# Patient Record
Sex: MALE | Race: BLACK OR AFRICAN AMERICAN | NOT HISPANIC OR LATINO | Employment: OTHER | URBAN - METROPOLITAN AREA
[De-identification: names, ages, dates, MRNs, and addresses within clinical notes are randomized per-mention and may not be internally consistent; named-entity substitution may affect disease eponyms.]

---

## 2017-01-24 ENCOUNTER — OFFICE VISIT (OUTPATIENT)
Dept: PAIN MEDICINE | Facility: CLINIC | Age: 77
End: 2017-01-24
Payer: MEDICARE

## 2017-01-24 VITALS
TEMPERATURE: 98 F | WEIGHT: 158.75 LBS | DIASTOLIC BLOOD PRESSURE: 70 MMHG | RESPIRATION RATE: 20 BRPM | BODY MASS INDEX: 24.86 KG/M2 | SYSTOLIC BLOOD PRESSURE: 120 MMHG | HEART RATE: 74 BPM

## 2017-01-24 DIAGNOSIS — M48.061 LUMBAR STENOSIS: Primary | ICD-10-CM

## 2017-01-24 DIAGNOSIS — M54.16 BILATERAL LUMBAR RADICULOPATHY: ICD-10-CM

## 2017-01-24 DIAGNOSIS — M43.16 SPONDYLOLISTHESIS OF LUMBAR REGION: ICD-10-CM

## 2017-01-24 PROCEDURE — 99212 OFFICE O/P EST SF 10 MIN: CPT | Mod: S$PBB,,, | Performed by: ANESTHESIOLOGY

## 2017-01-24 PROCEDURE — 99999 PR PBB SHADOW E&M-EST. PATIENT-LVL III: CPT | Mod: PBBFAC,,, | Performed by: ANESTHESIOLOGY

## 2017-01-24 PROCEDURE — 99213 OFFICE O/P EST LOW 20 MIN: CPT | Mod: PBBFAC,PO | Performed by: ANESTHESIOLOGY

## 2017-01-24 RX ORDER — OXYCODONE AND ACETAMINOPHEN 10; 325 MG/1; MG/1
1 TABLET ORAL 3 TIMES DAILY PRN
Qty: 90 TABLET | Refills: 0 | Status: SHIPPED | OUTPATIENT
Start: 2017-01-29 | End: 2017-02-28

## 2017-01-24 RX ORDER — OXYCODONE AND ACETAMINOPHEN 10; 325 MG/1; MG/1
1 TABLET ORAL 3 TIMES DAILY PRN
Qty: 90 TABLET | Refills: 0 | Status: SHIPPED | OUTPATIENT
Start: 2017-03-30 | End: 2017-04-24 | Stop reason: SDUPTHER

## 2017-01-24 RX ORDER — OXYCODONE AND ACETAMINOPHEN 10; 325 MG/1; MG/1
1 TABLET ORAL 3 TIMES DAILY PRN
Qty: 90 TABLET | Refills: 0 | Status: SHIPPED | OUTPATIENT
Start: 2017-02-28 | End: 2017-03-30
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
## 2017-01-31 ENCOUNTER — CLINICAL SUPPORT (OUTPATIENT)
Dept: ELECTROPHYSIOLOGY | Facility: CLINIC | Age: 77
End: 2017-01-31
Payer: MEDICARE

## 2017-01-31 DIAGNOSIS — I49.5 SINUS NODE DYSFUNCTION: Chronic | ICD-10-CM

## 2017-01-31 DIAGNOSIS — Z95.0 CARDIAC PACEMAKER IN SITU: ICD-10-CM

## 2017-01-31 PROCEDURE — 93293 PM PHONE R-STRIP DEVICE EVAL: CPT | Mod: PBBFAC | Performed by: INTERNAL MEDICINE

## 2017-02-06 RX ORDER — RANOLAZINE 500 MG/1
TABLET, FILM COATED, EXTENDED RELEASE ORAL
Qty: 60 TABLET | Refills: 4 | Status: SHIPPED | OUTPATIENT
Start: 2017-02-06 | End: 2017-07-08 | Stop reason: SDUPTHER

## 2017-02-20 ENCOUNTER — CLINICAL SUPPORT (OUTPATIENT)
Dept: CARDIOLOGY | Facility: CLINIC | Age: 77
End: 2017-02-20
Payer: MEDICARE

## 2017-02-20 DIAGNOSIS — I49.5 SINUS NODE DYSFUNCTION: ICD-10-CM

## 2017-02-20 DIAGNOSIS — Z95.0 CARDIAC PACEMAKER IN SITU: ICD-10-CM

## 2017-02-20 DIAGNOSIS — Z95.0 CARDIAC PACEMAKER IN SITU: Primary | ICD-10-CM

## 2017-02-20 PROCEDURE — 93280 PM DEVICE PROGR EVAL DUAL: CPT | Mod: PBBFAC,PO | Performed by: INTERNAL MEDICINE

## 2017-03-23 ENCOUNTER — CLINICAL SUPPORT (OUTPATIENT)
Dept: CARDIOLOGY | Facility: CLINIC | Age: 77
End: 2017-03-23
Payer: MEDICARE

## 2017-03-23 ENCOUNTER — LAB VISIT (OUTPATIENT)
Dept: LAB | Facility: HOSPITAL | Age: 77
End: 2017-03-23
Attending: INTERNAL MEDICINE
Payer: MEDICARE

## 2017-03-23 DIAGNOSIS — C14.0 THROAT CANCER: ICD-10-CM

## 2017-03-23 DIAGNOSIS — I25.810 CORONARY ARTERY DISEASE INVOLVING CORONARY BYPASS GRAFT OF NATIVE HEART WITHOUT ANGINA PECTORIS: Chronic | ICD-10-CM

## 2017-03-23 DIAGNOSIS — Z95.0 PACEMAKER: Chronic | ICD-10-CM

## 2017-03-23 DIAGNOSIS — I35.0 NONRHEUMATIC AORTIC VALVE STENOSIS: ICD-10-CM

## 2017-03-23 DIAGNOSIS — Z98.61 HISTORY OF PTCA: Chronic | ICD-10-CM

## 2017-03-23 DIAGNOSIS — Z95.1 S/P CABG (CORONARY ARTERY BYPASS GRAFT): Chronic | ICD-10-CM

## 2017-03-23 DIAGNOSIS — E78.5 DYSLIPIDEMIA: Chronic | ICD-10-CM

## 2017-03-23 DIAGNOSIS — I70.90 ATHEROSCLEROSIS: ICD-10-CM

## 2017-03-23 DIAGNOSIS — I10 ESSENTIAL HYPERTENSION: Chronic | ICD-10-CM

## 2017-03-23 LAB
ALBUMIN SERPL BCP-MCNC: 3.2 G/DL
ALP SERPL-CCNC: 70 U/L
ALT SERPL W/O P-5'-P-CCNC: 17 U/L
ANION GAP SERPL CALC-SCNC: 7 MMOL/L
AORTIC VALVE REGURGITATION: ABNORMAL
AORTIC VALVE STENOSIS: ABNORMAL
AST SERPL-CCNC: 17 U/L
BILIRUB SERPL-MCNC: 0.6 MG/DL
BUN SERPL-MCNC: 21 MG/DL
CALCIUM SERPL-MCNC: 9.2 MG/DL
CHLORIDE SERPL-SCNC: 106 MMOL/L
CHOLEST/HDLC SERPL: 3.4 {RATIO}
CO2 SERPL-SCNC: 26 MMOL/L
CREAT SERPL-MCNC: 1.1 MG/DL
DIASTOLIC DYSFUNCTION: YES
EST. GFR  (AFRICAN AMERICAN): >60 ML/MIN/1.73 M^2
EST. GFR  (NON AFRICAN AMERICAN): >60 ML/MIN/1.73 M^2
ESTIMATED PA SYSTOLIC PRESSURE: 25.09
GLUCOSE SERPL-MCNC: 99 MG/DL
HDL/CHOLESTEROL RATIO: 29.8 %
HDLC SERPL-MCNC: 121 MG/DL
HDLC SERPL-MCNC: 36 MG/DL
LDLC SERPL CALC-MCNC: 69 MG/DL
NONHDLC SERPL-MCNC: 85 MG/DL
POTASSIUM SERPL-SCNC: 4 MMOL/L
PROT SERPL-MCNC: 7.6 G/DL
RETIRED EF AND QEF - SEE NOTES: 55 (ref 55–65)
SODIUM SERPL-SCNC: 139 MMOL/L
TRICUSPID VALVE REGURGITATION: ABNORMAL
TRIGL SERPL-MCNC: 80 MG/DL

## 2017-03-23 PROCEDURE — 93306 TTE W/DOPPLER COMPLETE: CPT | Mod: 26,S$PBB,, | Performed by: INTERNAL MEDICINE

## 2017-03-23 PROCEDURE — 93880 EXTRACRANIAL BILAT STUDY: CPT | Mod: PBBFAC,PO | Performed by: INTERNAL MEDICINE

## 2017-03-30 ENCOUNTER — TELEPHONE (OUTPATIENT)
Dept: CARDIOLOGY | Facility: CLINIC | Age: 77
End: 2017-03-30

## 2017-03-30 LAB — INTERNAL CAROTID STENOSIS: NORMAL

## 2017-04-06 ENCOUNTER — OFFICE VISIT (OUTPATIENT)
Dept: CARDIOLOGY | Facility: CLINIC | Age: 77
End: 2017-04-06
Payer: MEDICARE

## 2017-04-06 VITALS
BODY MASS INDEX: 24.88 KG/M2 | SYSTOLIC BLOOD PRESSURE: 140 MMHG | DIASTOLIC BLOOD PRESSURE: 71 MMHG | WEIGHT: 158.5 LBS | HEART RATE: 61 BPM | HEIGHT: 67 IN

## 2017-04-06 DIAGNOSIS — Z98.61 HISTORY OF PTCA: Primary | Chronic | ICD-10-CM

## 2017-04-06 DIAGNOSIS — I10 ESSENTIAL HYPERTENSION: Chronic | ICD-10-CM

## 2017-04-06 DIAGNOSIS — E78.5 DYSLIPIDEMIA: Chronic | ICD-10-CM

## 2017-04-06 DIAGNOSIS — Z95.1 S/P CABG (CORONARY ARTERY BYPASS GRAFT): Chronic | ICD-10-CM

## 2017-04-06 DIAGNOSIS — Z95.0 PACEMAKER: Chronic | ICD-10-CM

## 2017-04-06 PROCEDURE — 99213 OFFICE O/P EST LOW 20 MIN: CPT | Mod: PBBFAC,PO | Performed by: INTERNAL MEDICINE

## 2017-04-06 PROCEDURE — 99214 OFFICE O/P EST MOD 30 MIN: CPT | Mod: S$PBB,,, | Performed by: INTERNAL MEDICINE

## 2017-04-06 PROCEDURE — 99999 PR PBB SHADOW E&M-EST. PATIENT-LVL III: CPT | Mod: PBBFAC,,, | Performed by: INTERNAL MEDICINE

## 2017-04-06 NOTE — PROGRESS NOTES
Subjective:    Patient ID:  Dung Lewis is a 76 y.o. male who presents for follow-up of No chief complaint on file.      HPI   Here for f/u CABG/PPM/PCI. Patients states is doing well no chest pain, SOB or change in exertional tolerence. Patient does not exercise but remains very active with out change in exertional tolerance or chest pain.     Review of Systems   Constitution: Negative for decreased appetite and malaise/fatigue.   HENT: Negative for congestion and nosebleeds.    Eyes: Negative for blurred vision.   Cardiovascular: Negative for chest pain, claudication, cyanosis, dyspnea on exertion, irregular heartbeat, leg swelling, near-syncope, orthopnea, palpitations, paroxysmal nocturnal dyspnea and syncope.   Respiratory: Negative for cough and shortness of breath.    Endocrine: Negative for polyuria.   Hematologic/Lymphatic: Does not bruise/bleed easily.   Musculoskeletal: Positive for back pain and joint pain. Negative for falls, joint swelling, muscle cramps, muscle weakness and myalgias.   Gastrointestinal: Negative for bloating, abdominal pain, change in bowel habit, nausea and vomiting.   Genitourinary: Negative for urgency.   Neurological: Negative for dizziness, focal weakness and light-headedness.   Psychiatric/Behavioral: Negative for altered mental status.        Objective:    Physical Exam   Constitutional: He is oriented to person, place, and time. He appears well-developed and well-nourished. He is cooperative.   HENT:   Head: Normocephalic and atraumatic.   Eyes: Conjunctivae are normal. Right eye exhibits no exudate. Left eye exhibits no exudate.   Neck: Normal range of motion. Neck supple. Normal carotid pulses and no JVD present. Carotid bruit is not present. No thyromegaly present.   Cardiovascular: Normal rate, regular rhythm, normal heart sounds and intact distal pulses.    Pulses:       Carotid pulses are 2+ on the right side, and 2+ on the left side.       Radial pulses are 2+ on  the right side, and 2+ on the left side.        Dorsalis pedis pulses are 2+ on the right side, and 2+ on the left side.        Posterior tibial pulses are 2+ on the right side, and 2+ on the left side.   Pacer site clean and dry, well healed.  Well healed midline sternal incision.     Pulmonary/Chest: Effort normal and breath sounds normal.   Abdominal: Soft. Bowel sounds are normal.   Musculoskeletal: Normal range of motion. He exhibits no edema.   Neurological: He is alert and oriented to person, place, and time. Gait normal.   Skin: Skin is warm, dry and intact. No cyanosis. Nails show no clubbing.   Psychiatric: He has a normal mood and affect. His speech is normal and behavior is normal. Judgment and thought content normal.             ..    Chemistry        Component Value Date/Time     03/23/2017 0813    K 4.0 03/23/2017 0813     03/23/2017 0813    CO2 26 03/23/2017 0813    BUN 21 03/23/2017 0813    CREATININE 1.1 03/23/2017 0813    GLU 99 03/23/2017 0813        Component Value Date/Time    CALCIUM 9.2 03/23/2017 0813    ALKPHOS 70 03/23/2017 0813    AST 17 03/23/2017 0813    ALT 17 03/23/2017 0813    BILITOT 0.6 03/23/2017 0813            ..  Lab Results   Component Value Date    CHOL 121 03/23/2017    CHOL 130 04/06/2016    CHOL 135 07/10/2015     Lab Results   Component Value Date    HDL 36 (L) 03/23/2017    HDL 41 04/06/2016    HDL 44 07/10/2015     Lab Results   Component Value Date    LDLCALC 69.0 03/23/2017    LDLCALC 73.6 04/06/2016    LDLCALC 74.4 07/10/2015     Lab Results   Component Value Date    TRIG 80 03/23/2017    TRIG 77 04/06/2016    TRIG 83 07/10/2015     Lab Results   Component Value Date    CHOLHDL 29.8 03/23/2017    CHOLHDL 31.5 04/06/2016    CHOLHDL 32.6 07/10/2015     ..  Lab Results   Component Value Date    WBC 7.08 05/12/2016    HGB 12.9 (L) 05/12/2016    HCT 38.7 (L) 05/12/2016    MCV 93 05/12/2016     05/12/2016       Test(s) Reviewed  I have reviewed the  following in detail:  [] Stress test   [] Angiography   [x] Echocardiogram   [x] Labs   [x] Other:       Assessment:         ICD-10-CM ICD-9-CM   1. History of PTCA Z98.61 V45.82   2. S/P CABG (CABG x 5 (07/2006) Z95.1 V45.81   3. Pacemaker 08/11/2010) dual chamber, DDD-R, [employer] Z95.0 V45.01   4. Essential hypertension I10 401.9   5. Dyslipidemia E78.5 272.4     Problem List Items Addressed This Visit     Dyslipidemia (Chronic)    Essential hypertension (Chronic)    History of PTCA - Primary (Chronic)    Overview     CX- ostial promus 3x18      Previous PCI:                                                                S/P stent to LCX, 08/23/2011                                                 S/P coated stent to LAD, 11/07/2005                                          S/P coated stent to ramus, 11/07/2005                                        S/P coated stent to LCX, 11/07/2005             Pacemaker 08/11/2010) dual chamber, DDD-R, [employer] (Chronic)    Overview     08/11/2010) dual chamber, DDD-R, [employer]         S/P CABG (CABG x 5 (07/2006) (Chronic)    Overview     CABG x 5 (07/2006 LIMA to LAD (single graft), SVG to OM1 (single graft),     SVG to D1 (Y graft), SVG to LAD (Y graft), SVG to RCA (single graft))        Status (08/23/2011  LIMA to LAD (single graft) patent, 08/11/2010            LIMA to LAD (single graft) occluded, 04/25/2008  HERNANDEZ to LAD (single         graft) occluded                  Plan:           Return to clinic 1 year   Low level/low impact aerobic exercise 5x's/wk. Heart healthy diet and risk factor modification.    See labs and med orders.

## 2017-04-06 NOTE — MR AVS SNAPSHOT
[facility]  [address] [facility] [address]  [place] [address]  Phone: [phone]                  Dung Lewis   2017 10:20 AM   Office Visit    Description:  Male : 1940   Provider:  Amandeep Christina MD   Department:  [place] - Cardiology           Reason for Visit     Coronary Artery Disease     Dyslipidemia           Diagnoses this Visit        Comments    History of PTCA    -  Primary     S/P CABG (coronary artery bypass graft)         Pacemaker         Essential hypertension         Dyslipidemia                To Do List           Future Appointments        Provider Department Dept Phone    2017 11:00 AM EMILIA Teresa [place] - Pain Management [phone]    2017 10:00 AM TELEPHONE CHECK, PACEMAKER [address] - Arrhythmia [phone]      Goals (5 Years of Data)     None      Follow-Up and Disposition     Return in about 1 year (around 2018).      [facility]     [facility] Nurse Care Line -  Assistance  Unless otherwise directed by your provider, please contact [facility], our nurse care line that is available for  assistance.     Registered nurses in the [facility] provide: appointment scheduling, clinical advisement, health education, and other advisory services.  Call: [phone] (toll free)               Medications           Message regarding Medications     Verify the changes and/or additions to your medication regime listed below are the same as discussed with your clinician today.  If any of these changes or additions are incorrect, please notify your healthcare provider.             Verify that the below list of medications is an accurate representation of the medications you are currently taking.  If none reported, the list may be blank. If incorrect, please contact your healthcare provider. Carry this list with you in case of emergency.           Current Medications     amlodipine (NORVASC) 2.5 MG tablet  "TAKE 1 TABLET (2.5 MG TOTAL) BY MOUTH ONCE DAILY.    aspirin 81 mg Tab Take 1 tablet by mouth Every morning. Every morning    benazepril (LOTENSIN) 5 MG tablet TAKE 1 TABLET BY MOUTH DAILY FOR HIGH BLOOD PRESSURE    carvedilol (COREG) 3.125 MG tablet TAKE 1 TABLET BY MOUTH 2 (TWO) TIMES DAILY BEFORE MEALS - FOR HEART OR HIGH BLOOD PRESSURE    clopidogrel (PLAVIX) 75 mg tablet TAKE 1 TABLET BY MOUTH DAILY (BLOOD THINNER)    diphenhydrAMINE-aluminum-magnesium hydroxide-simethicone-lidocaine HCl 2% Swish and spit.    FLU VACCINE LW1495-03,5 YR UP, (AFLURIA [id number] IM) Inject into the muscle.    levothyroxine (SYNTHROID) 100 MCG tablet Take 1 tablet by mouth Every morning. Every morning    lorazepam (ATIVAN) 1 MG tablet TAKE 1 TABLET BY MOUTH EACH EVENING AS NEEDED FOR ANXIETY    nitroGLYCERIN (NITROSTAT) 0.4 MG SL tablet Place 1 tablet (0.4 mg total) under the tongue every 5 (five) minutes as needed for Chest pain. As directed PRN    oxycodone-acetaminophen (PERCOCET)  mg per tablet Take 1 tablet by mouth 3 (three) times daily as needed for Pain.    polyethylene glycol (GLYCOLAX) 17 gram/dose powder Take 17 g by mouth daily as needed.     RANEXA 500 mg Tb12 TAKE 1 TABLET BY MOUTH TWICE DAILY FOR CHEST PAIN PREVENTION    simvastatin (ZOCOR) 40 MG tablet TAKE 1 TABLET BY MOUTH EACH EVENING FOR CHOLESTEROL    temazepam (RESTORIL) 30 mg capsule TAKE 1 CAPSULE BY MOUTH EACH NIGHT AT BEDTIME BEFORE SLEEP           Clinical Reference Information           Your Vitals Were     BP Pulse Height Weight BMI    140/71 (BP Location: Right arm, Patient Position: Sitting, BP Method: Automatic) 61 5' 7" (1.702 m) 71.9 kg (158 lb 8.2 oz) 24.83 kg/m2      Blood Pressure          Most Recent Value    BP  (!)  140/71      Allergies as of 4/6/2017     No Known Drug Allergies      Immunizations Administered on Date of Encounter - 4/6/2017     None      Orders Placed During Today's Visit     Future Labs/Procedures Expected by Expires "    2D echo with color flow doppler  4/6/2018 4/7/2018    CBC auto differential  4/6/2018 6/5/2018    Comprehensive metabolic panel  4/6/2018 4/7/2018    Lipid panel  4/6/2018 4/7/2018      Smoking Cessation     If you would like to quit smoking:   You may be eligible for free services if you are a [place] resident and started smoking cigarettes before September 1, 1988.  Call the Smoking Cessation Trust ([facility]) toll free at ([phone] or ([phone] [phone].   Call 1-800-QUIT-NOW if you do not meet the above criteria.   Contact us via email: [email or web address]   View our website for more information: [email or web address]        Language Assistance Services     ATTENTION: Language assistance services are available, free of charge. Please call [phone].      ATENCIÓN: Si habla español, tiene a connell disposición servicios gratuitos de asistencia lingüística. Llame al [phone].     CHÚ Ý: N?u b?n nói Ti?ng Vi?t, có các d?ch v? h? tr? ngôn ng? mi?n phí dành cho b?n. G?i s? [phone].         [facility] complies with applicable Federal civil rights laws and does not discriminate on the basis of race, color, national origin, age, disability, or sex.

## 2017-04-24 ENCOUNTER — OFFICE VISIT (OUTPATIENT)
Dept: PAIN MEDICINE | Facility: CLINIC | Age: 77
End: 2017-04-24
Payer: MEDICARE

## 2017-04-24 VITALS
SYSTOLIC BLOOD PRESSURE: 120 MMHG | BODY MASS INDEX: 25.11 KG/M2 | WEIGHT: 160.31 LBS | HEART RATE: 72 BPM | DIASTOLIC BLOOD PRESSURE: 70 MMHG | TEMPERATURE: 98 F

## 2017-04-24 DIAGNOSIS — M43.16 SPONDYLOLISTHESIS OF LUMBAR REGION: ICD-10-CM

## 2017-04-24 DIAGNOSIS — M54.16 BILATERAL LUMBAR RADICULOPATHY: ICD-10-CM

## 2017-04-24 DIAGNOSIS — G89.4 CHRONIC PAIN DISORDER: ICD-10-CM

## 2017-04-24 DIAGNOSIS — M48.061 LUMBAR STENOSIS: Primary | ICD-10-CM

## 2017-04-24 PROCEDURE — 99213 OFFICE O/P EST LOW 20 MIN: CPT | Mod: PBBFAC,PO | Performed by: PHYSICIAN ASSISTANT

## 2017-04-24 PROCEDURE — 99999 PR PBB SHADOW E&M-EST. PATIENT-LVL III: CPT | Mod: PBBFAC,,, | Performed by: PHYSICIAN ASSISTANT

## 2017-04-24 PROCEDURE — 99213 OFFICE O/P EST LOW 20 MIN: CPT | Mod: S$PBB,,, | Performed by: PHYSICIAN ASSISTANT

## 2017-04-24 RX ORDER — OXYCODONE AND ACETAMINOPHEN 10; 325 MG/1; MG/1
1 TABLET ORAL 3 TIMES DAILY PRN
Qty: 90 TABLET | Refills: 0 | Status: SHIPPED | OUTPATIENT
Start: 2017-04-29 | End: 2017-05-25 | Stop reason: SDUPTHER

## 2017-04-25 NOTE — TELEPHONE ENCOUNTER
----- Message from Bianca Cates sent at 4/25/2017  9:28 AM CDT -----  Contact: pt  Refill (ATIVAN) 1 MG tablet (pt is out)  Call back on# (pt is requesting a courtesy call once sent)  thanks    [facility] - [place], [place] - [address]  [address]  [place] [address]  Phone: [phone] Fax: [phone]

## 2017-04-25 NOTE — TELEPHONE ENCOUNTER
Spoke with [facility]. Ativan was filled by Dr Christina 2016 with 5 refills. Prescription was filled 5 times and is also now . Explained to Pharmacist Dr Christina probably filled as a courtesy on a visit with patient. Medication needs to be requested through the PCP. Pharmacist to fax Dr Tirado for refills.

## 2017-04-26 ENCOUNTER — TELEPHONE (OUTPATIENT)
Dept: CARDIOLOGY | Facility: CLINIC | Age: 77
End: 2017-04-26

## 2017-04-26 NOTE — TELEPHONE ENCOUNTER
Spoke with patient and informed him Ativan should be filled by PCP. Patient to see Dr Tirado today.  Request was sent to [facility] to request medication from Dr Tirado.  Another msg sent to Dr Christina re: refill. Will inform pt if Dr Christina will fill or not. Explained to patient Ativan is not a regular cardiac medication and is usually obtained from PCP. Pt not happy
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
## 2017-05-15 RX ORDER — LORAZEPAM 1 MG/1
TABLET ORAL
Qty: 45 TABLET | Refills: 1 | Status: SHIPPED | OUTPATIENT
Start: 2017-05-15 | End: 2017-06-21 | Stop reason: SDUPTHER

## 2017-05-15 RX ORDER — LORAZEPAM 1 MG/1
TABLET ORAL
Qty: 45 TABLET | Refills: 5 | OUTPATIENT
Start: 2017-05-15

## 2017-05-25 ENCOUNTER — CLINICAL SUPPORT (OUTPATIENT)
Dept: ELECTROPHYSIOLOGY | Facility: CLINIC | Age: 77
End: 2017-05-25
Payer: MEDICARE

## 2017-05-25 DIAGNOSIS — I49.5 SINUS NODE DYSFUNCTION: Chronic | ICD-10-CM

## 2017-05-25 DIAGNOSIS — Z95.0 CARDIAC PACEMAKER IN SITU: ICD-10-CM

## 2017-05-25 PROCEDURE — 93293 PM PHONE R-STRIP DEVICE EVAL: CPT | Mod: PBBFAC | Performed by: INTERNAL MEDICINE

## 2017-05-25 NOTE — TELEPHONE ENCOUNTER
----- Message from Yola Holly sent at 5/25/2017 10:18 AM CDT -----  Refill on Rx Hydrocodone, please send into [facility].  Please call patient when called in/[phone].

## 2017-05-26 RX ORDER — OXYCODONE AND ACETAMINOPHEN 10; 325 MG/1; MG/1
1 TABLET ORAL 3 TIMES DAILY PRN
Qty: 90 TABLET | Refills: 0 | Status: SHIPPED | OUTPATIENT
Start: 2017-05-29 | End: 2017-06-27 | Stop reason: SDUPTHER

## 2017-05-26 NOTE — TELEPHONE ENCOUNTER
----- Message from Eleanor New sent at 5/24/2017 10:58 AM CDT -----  Contact: Patient  Dung, patient [phone], Calling to speak with the nurse. Private. Thanks.

## 2017-06-21 NOTE — TELEPHONE ENCOUNTER
----- Message from Marco Cates sent at 6/21/2017 10:02 AM CDT -----  Contact: [phone]  Patient requesting a refill on ativan.    Patient will be using   [facility] [address]  [address]  [place] [address]  Phone: [phone] Fax: [phone]    Please call patient at [phone]. Thanks!

## 2017-06-21 NOTE — TELEPHONE ENCOUNTER
Attempted to call pt back, unable leave voicemail as phone went to busy signal.  Prescription forwarded to Dr Christina for approval.

## 2017-06-22 RX ORDER — LORAZEPAM 1 MG/1
TABLET ORAL
Qty: 45 TABLET | Refills: 1 | Status: ON HOLD | OUTPATIENT
Start: 2017-06-22 | End: 2018-01-01 | Stop reason: HOSPADM

## 2017-06-27 ENCOUNTER — TELEPHONE (OUTPATIENT)
Dept: PAIN MEDICINE | Facility: CLINIC | Age: 77
End: 2017-06-27

## 2017-06-27 RX ORDER — OXYCODONE AND ACETAMINOPHEN 10; 325 MG/1; MG/1
1 TABLET ORAL 3 TIMES DAILY PRN
Qty: 90 TABLET | Refills: 0 | Status: SHIPPED | OUTPATIENT
Start: 2017-06-28 | End: 2017-07-17 | Stop reason: SDUPTHER

## 2017-06-27 NOTE — TELEPHONE ENCOUNTER
----- Message from Marco Cates sent at 6/27/2017  1:56 PM CDT -----  Contact: [phone]  Patient requesting a refill on oxycodone 10mg.    Patient will be using   [facility] [address] - [address]  [address]  [place] [address]  Phone: [phone] Fax: [phone]    Please call patient at [phone]. Thanks!

## 2017-07-10 RX ORDER — RANOLAZINE 500 MG/1
TABLET, FILM COATED, EXTENDED RELEASE ORAL
Qty: 60 TABLET | Refills: 4 | Status: SHIPPED | OUTPATIENT
Start: 2017-07-10 | End: 2018-01-05 | Stop reason: SDUPTHER

## 2017-07-17 ENCOUNTER — OFFICE VISIT (OUTPATIENT)
Dept: PAIN MEDICINE | Facility: CLINIC | Age: 77
End: 2017-07-17
Payer: MEDICARE

## 2017-07-17 VITALS
TEMPERATURE: 99 F | WEIGHT: 158.94 LBS | SYSTOLIC BLOOD PRESSURE: 128 MMHG | HEART RATE: 76 BPM | RESPIRATION RATE: 20 BRPM | DIASTOLIC BLOOD PRESSURE: 74 MMHG | BODY MASS INDEX: 24.9 KG/M2

## 2017-07-17 DIAGNOSIS — M51.36 DDD (DEGENERATIVE DISC DISEASE), LUMBAR: Primary | ICD-10-CM

## 2017-07-17 DIAGNOSIS — M43.16 SPONDYLOLISTHESIS OF LUMBAR REGION: ICD-10-CM

## 2017-07-17 DIAGNOSIS — G89.4 CHRONIC PAIN DISORDER: ICD-10-CM

## 2017-07-17 DIAGNOSIS — Z79.891 OPIOID CONTRACT EXISTS: ICD-10-CM

## 2017-07-17 DIAGNOSIS — M48.061 LUMBAR STENOSIS: ICD-10-CM

## 2017-07-17 PROCEDURE — 99213 OFFICE O/P EST LOW 20 MIN: CPT | Mod: S$PBB,,, | Performed by: PHYSICIAN ASSISTANT

## 2017-07-17 PROCEDURE — 1159F MED LIST DOCD IN RCRD: CPT | Mod: ,,, | Performed by: PHYSICIAN ASSISTANT

## 2017-07-17 PROCEDURE — 99999 PR PBB SHADOW E&M-EST. PATIENT-LVL IV: CPT | Mod: PBBFAC,,, | Performed by: PHYSICIAN ASSISTANT

## 2017-07-17 PROCEDURE — 99214 OFFICE O/P EST MOD 30 MIN: CPT | Mod: PBBFAC,PO | Performed by: PHYSICIAN ASSISTANT

## 2017-07-17 PROCEDURE — 1125F AMNT PAIN NOTED PAIN PRSNT: CPT | Mod: ,,, | Performed by: PHYSICIAN ASSISTANT

## 2017-07-17 RX ORDER — OXYCODONE AND ACETAMINOPHEN 10; 325 MG/1; MG/1
1 TABLET ORAL 3 TIMES DAILY PRN
Qty: 90 TABLET | Refills: 0 | Status: SHIPPED | OUTPATIENT
Start: 2017-09-26 | End: 2017-10-19 | Stop reason: SDUPTHER

## 2017-07-17 RX ORDER — OXYCODONE AND ACETAMINOPHEN 10; 325 MG/1; MG/1
1 TABLET ORAL 3 TIMES DAILY PRN
Qty: 90 TABLET | Refills: 0 | Status: SHIPPED | OUTPATIENT
Start: 2017-08-27 | End: 2017-09-26

## 2017-07-17 RX ORDER — OXYCODONE AND ACETAMINOPHEN 10; 325 MG/1; MG/1
1 TABLET ORAL 3 TIMES DAILY PRN
Qty: 90 TABLET | Refills: 0 | Status: SHIPPED | OUTPATIENT
Start: 2017-07-28 | End: 2017-08-27
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
## 2017-07-21 ENCOUNTER — TELEPHONE (OUTPATIENT)
Dept: PAIN MEDICINE | Facility: CLINIC | Age: 77
End: 2017-07-21

## 2017-07-21 ENCOUNTER — HOSPITAL ENCOUNTER (OUTPATIENT)
Dept: RADIOLOGY | Facility: HOSPITAL | Age: 77
Discharge: HOME | End: 2017-07-21
Attending: ANESTHESIOLOGY
Payer: MEDICARE

## 2017-07-21 DIAGNOSIS — M51.36 DDD (DEGENERATIVE DISC DISEASE), LUMBAR: ICD-10-CM

## 2017-07-21 PROCEDURE — 72131 CT LUMBAR SPINE W/O DYE: CPT | Mod: TC,PO

## 2017-07-21 PROCEDURE — 72131 CT LUMBAR SPINE W/O DYE: CPT | Mod: 26,,, | Performed by: RADIOLOGY

## 2017-07-21 NOTE — TELEPHONE ENCOUNTER
Please let the patient know I reviewed his lumbar spine CT results and there have been no significant changes.  He does have stenosis at L4/5 but significant facet arthritis as well throughout the spine.  If he feels his pain is bad enough to do something about, please schedule him for right L1, 2, 3, 4 and 5 medial branch blocks and RFA if indicated.

## 2017-07-24 NOTE — TELEPHONE ENCOUNTER
Spoke with the patient and he wants to think about it. If he decides to proceed, he will contact the office.

## 2017-08-31 ENCOUNTER — CLINICAL SUPPORT (OUTPATIENT)
Dept: ELECTROPHYSIOLOGY | Facility: CLINIC | Age: 77
End: 2017-08-31
Payer: MEDICARE

## 2017-08-31 DIAGNOSIS — Z95.0 CARDIAC PACEMAKER IN SITU: ICD-10-CM

## 2017-08-31 DIAGNOSIS — I49.5 SINUS NODE DYSFUNCTION: Chronic | ICD-10-CM

## 2017-08-31 PROCEDURE — 93293 PM PHONE R-STRIP DEVICE EVAL: CPT | Mod: PBBFAC | Performed by: INTERNAL MEDICINE

## 2017-09-19 RX ORDER — BENAZEPRIL HYDROCHLORIDE 5 MG/1
TABLET ORAL
Qty: 90 TABLET | Refills: 3 | Status: SHIPPED | OUTPATIENT
Start: 2017-09-19 | End: 2018-01-01 | Stop reason: SDUPTHER

## 2017-09-19 RX ORDER — CARVEDILOL 3.12 MG/1
TABLET ORAL
Qty: 180 TABLET | Refills: 6 | Status: SHIPPED | OUTPATIENT
Start: 2017-09-19 | End: 2018-01-01 | Stop reason: SDUPTHER

## 2017-10-19 ENCOUNTER — OFFICE VISIT (OUTPATIENT)
Dept: PAIN MEDICINE | Facility: CLINIC | Age: 77
End: 2017-10-19
Payer: MEDICARE

## 2017-10-19 VITALS
DIASTOLIC BLOOD PRESSURE: 68 MMHG | WEIGHT: 159.81 LBS | RESPIRATION RATE: 20 BRPM | HEART RATE: 72 BPM | SYSTOLIC BLOOD PRESSURE: 142 MMHG | HEIGHT: 68 IN | BODY MASS INDEX: 24.22 KG/M2

## 2017-10-19 DIAGNOSIS — M51.36 DDD (DEGENERATIVE DISC DISEASE), LUMBAR: Primary | ICD-10-CM

## 2017-10-19 DIAGNOSIS — Z79.891 OPIOID CONTRACT EXISTS: ICD-10-CM

## 2017-10-19 DIAGNOSIS — M43.16 SPONDYLOLISTHESIS OF LUMBAR REGION: ICD-10-CM

## 2017-10-19 DIAGNOSIS — M47.816 FACET HYPERTROPHY OF LUMBAR REGION: ICD-10-CM

## 2017-10-19 DIAGNOSIS — G89.4 CHRONIC PAIN DISORDER: ICD-10-CM

## 2017-10-19 DIAGNOSIS — M48.061 SPINAL STENOSIS OF LUMBAR REGION WITHOUT NEUROGENIC CLAUDICATION: ICD-10-CM

## 2017-10-19 PROCEDURE — 99214 OFFICE O/P EST MOD 30 MIN: CPT | Mod: S$PBB,,, | Performed by: PHYSICIAN ASSISTANT

## 2017-10-19 PROCEDURE — 99215 OFFICE O/P EST HI 40 MIN: CPT | Mod: PBBFAC,PO | Performed by: PHYSICIAN ASSISTANT

## 2017-10-19 PROCEDURE — 99999 PR PBB SHADOW E&M-EST. PATIENT-LVL V: CPT | Mod: PBBFAC,,, | Performed by: PHYSICIAN ASSISTANT

## 2017-10-19 RX ORDER — CARISOPRODOL 350 MG/1
TABLET ORAL
Refills: 0 | COMMUNITY
Start: 2017-10-02

## 2017-10-19 RX ORDER — OXYCODONE AND ACETAMINOPHEN 10; 325 MG/1; MG/1
1 TABLET ORAL 3 TIMES DAILY PRN
Qty: 90 TABLET | Refills: 0 | Status: SHIPPED | OUTPATIENT
Start: 2017-12-25 | End: 2018-01-25 | Stop reason: SDUPTHER

## 2017-10-19 RX ORDER — OXYCODONE AND ACETAMINOPHEN 10; 325 MG/1; MG/1
1 TABLET ORAL 3 TIMES DAILY PRN
Qty: 90 TABLET | Refills: 0 | Status: SHIPPED | OUTPATIENT
Start: 2017-10-26 | End: 2017-11-25

## 2017-10-19 RX ORDER — SODIUM CHLORIDE, SODIUM LACTATE, POTASSIUM CHLORIDE, CALCIUM CHLORIDE 600; 310; 30; 20 MG/100ML; MG/100ML; MG/100ML; MG/100ML
INJECTION, SOLUTION INTRAVENOUS CONTINUOUS
Status: CANCELLED | OUTPATIENT
Start: 2017-11-13

## 2017-10-19 RX ORDER — OXYCODONE AND ACETAMINOPHEN 10; 325 MG/1; MG/1
1 TABLET ORAL 3 TIMES DAILY PRN
Qty: 90 TABLET | Refills: 0 | Status: SHIPPED | OUTPATIENT
Start: 2017-11-25 | End: 2017-12-25
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
## 2017-11-02 ENCOUNTER — TELEPHONE (OUTPATIENT)
Dept: PAIN MEDICINE | Facility: CLINIC | Age: 77
End: 2017-11-02

## 2017-11-02 NOTE — TELEPHONE ENCOUNTER
----- Message from Eleanor New sent at 11/2/2017 11:32 AM CDT -----  Contact: Patient  Dung, patient [phone], Calling to speak with the nurse, regarding a new Rx for Pain. Please advise. Thanks.

## 2017-11-02 NOTE — TELEPHONE ENCOUNTER
Who is the physician prescribing him Soma? He should not have another muscle relaxant if taking Soma.

## 2017-11-02 NOTE — TELEPHONE ENCOUNTER
Patient states that he is having burning on his right side of back, neck and shoulder he wants to know if he should be taking a muscle relaxant. He thinks that he pulled a muscle because his pain medication isn't working.

## 2017-11-03 NOTE — TELEPHONE ENCOUNTER
Yes, he could take Aleve for a few days, this would be the safest one. Only take for a few days though

## 2017-11-07 DIAGNOSIS — M51.36 DDD (DEGENERATIVE DISC DISEASE), LUMBAR: Primary | ICD-10-CM

## 2017-11-08 ENCOUNTER — TELEPHONE (OUTPATIENT)
Dept: SURGERY | Facility: HOSPITAL | Age: 77
End: 2017-11-08

## 2017-11-08 NOTE — TELEPHONE ENCOUNTER
Pt. States he needs to reschedule his appointment for 11/13/2017 due to a conflict with another appointment. He was instructed to call the office.

## 2017-11-24 RX ORDER — AMLODIPINE BESYLATE 2.5 MG/1
TABLET ORAL
Qty: 90 TABLET | Refills: 5 | Status: SHIPPED | OUTPATIENT
Start: 2017-11-24 | End: 2018-01-01 | Stop reason: SDUPTHER

## 2017-12-07 ENCOUNTER — CLINICAL SUPPORT (OUTPATIENT)
Dept: ELECTROPHYSIOLOGY | Facility: CLINIC | Age: 77
End: 2017-12-07
Attending: INTERNAL MEDICINE
Payer: MEDICARE

## 2017-12-07 DIAGNOSIS — I49.5 SINUS NODE DYSFUNCTION: Chronic | ICD-10-CM

## 2017-12-07 DIAGNOSIS — Z95.0 CARDIAC PACEMAKER IN SITU: ICD-10-CM

## 2017-12-07 PROCEDURE — 93293 PM PHONE R-STRIP DEVICE EVAL: CPT | Mod: PBBFAC | Performed by: INTERNAL MEDICINE

## 2018-01-01 ENCOUNTER — ANESTHESIA EVENT (OUTPATIENT)
Dept: SURGERY | Facility: HOSPITAL | Age: 78
End: 2018-01-01
Payer: MEDICARE

## 2018-01-01 ENCOUNTER — TELEPHONE (OUTPATIENT)
Dept: PAIN MEDICINE | Facility: CLINIC | Age: 78
End: 2018-01-01

## 2018-01-01 ENCOUNTER — OFFICE VISIT (OUTPATIENT)
Dept: ORTHOPEDICS | Facility: CLINIC | Age: 78
End: 2018-01-01
Payer: MEDICARE

## 2018-01-01 ENCOUNTER — TELEPHONE (OUTPATIENT)
Dept: CARDIOLOGY | Facility: CLINIC | Age: 78
End: 2018-01-01

## 2018-01-01 ENCOUNTER — CLINICAL SUPPORT (OUTPATIENT)
Dept: CARDIOLOGY | Facility: CLINIC | Age: 78
End: 2018-01-01
Attending: INTERNAL MEDICINE
Payer: MEDICARE

## 2018-01-01 ENCOUNTER — HOSPITAL ENCOUNTER (OUTPATIENT)
Dept: RADIOLOGY | Facility: HOSPITAL | Age: 78
Discharge: HOME | End: 2018-11-14
Attending: ORTHOPAEDIC SURGERY
Payer: MEDICARE

## 2018-01-01 ENCOUNTER — HOSPITAL ENCOUNTER (OUTPATIENT)
Dept: RADIOLOGY | Facility: HOSPITAL | Age: 78
Discharge: HOME | End: 2018-09-27
Attending: ORTHOPAEDIC SURGERY
Payer: MEDICARE

## 2018-01-01 ENCOUNTER — HOSPITAL ENCOUNTER (OUTPATIENT)
Dept: RADIOLOGY | Facility: HOSPITAL | Age: 78
Discharge: HOME | End: 2018-09-25
Attending: PHYSICIAN ASSISTANT
Payer: MEDICARE

## 2018-01-01 ENCOUNTER — ANESTHESIA (OUTPATIENT)
Dept: SURGERY | Facility: HOSPITAL | Age: 78
End: 2018-01-01
Payer: MEDICARE

## 2018-01-01 ENCOUNTER — OFFICE VISIT (OUTPATIENT)
Dept: CARDIOLOGY | Facility: CLINIC | Age: 78
End: 2018-01-01
Payer: MEDICARE

## 2018-01-01 ENCOUNTER — HOSPITAL ENCOUNTER (OUTPATIENT)
Facility: HOSPITAL | Age: 78
LOS: 1 days | Discharge: HOME | End: 2018-09-28
Attending: ORTHOPAEDIC SURGERY | Admitting: ORTHOPAEDIC SURGERY
Payer: MEDICARE

## 2018-01-01 ENCOUNTER — TELEPHONE (OUTPATIENT)
Dept: SURGERY | Facility: HOSPITAL | Age: 78
End: 2018-01-01

## 2018-01-01 ENCOUNTER — HOSPITAL ENCOUNTER (OUTPATIENT)
Dept: RADIOLOGY | Facility: HOSPITAL | Age: 78
Discharge: HOME | End: 2018-09-26
Attending: ORTHOPAEDIC SURGERY
Payer: MEDICARE

## 2018-01-01 ENCOUNTER — HOSPITAL ENCOUNTER (OUTPATIENT)
Dept: RADIOLOGY | Facility: HOSPITAL | Age: 78
Discharge: HOME | End: 2018-10-10
Attending: ORTHOPAEDIC SURGERY
Payer: MEDICARE

## 2018-01-01 ENCOUNTER — OFFICE VISIT (OUTPATIENT)
Dept: PAIN MEDICINE | Facility: CLINIC | Age: 78
End: 2018-01-01
Payer: MEDICARE

## 2018-01-01 ENCOUNTER — HOSPITAL ENCOUNTER (OUTPATIENT)
Dept: RADIOLOGY | Facility: HOSPITAL | Age: 78
Discharge: HOME | End: 2018-08-06
Attending: ANESTHESIOLOGY
Payer: MEDICARE

## 2018-01-01 ENCOUNTER — HOSPITAL ENCOUNTER (OUTPATIENT)
Dept: PREADMISSION TESTING | Facility: HOSPITAL | Age: 78
Discharge: HOME | End: 2018-09-27
Attending: ORTHOPAEDIC SURGERY
Payer: MEDICARE

## 2018-01-01 VITALS
RESPIRATION RATE: 20 BRPM | OXYGEN SATURATION: 98 % | DIASTOLIC BLOOD PRESSURE: 54 MMHG | SYSTOLIC BLOOD PRESSURE: 130 MMHG | HEART RATE: 66 BPM | BODY MASS INDEX: 22.91 KG/M2 | TEMPERATURE: 98 F | WEIGHT: 146.25 LBS

## 2018-01-01 VITALS
HEIGHT: 67 IN | DIASTOLIC BLOOD PRESSURE: 42 MMHG | WEIGHT: 140 LBS | SYSTOLIC BLOOD PRESSURE: 129 MMHG | BODY MASS INDEX: 21.97 KG/M2 | HEART RATE: 59 BPM

## 2018-01-01 VITALS
TEMPERATURE: 98 F | DIASTOLIC BLOOD PRESSURE: 63 MMHG | OXYGEN SATURATION: 97 % | HEART RATE: 73 BPM | SYSTOLIC BLOOD PRESSURE: 149 MMHG | WEIGHT: 145.06 LBS | RESPIRATION RATE: 20 BRPM | BODY MASS INDEX: 22.72 KG/M2

## 2018-01-01 VITALS
RESPIRATION RATE: 16 BRPM | TEMPERATURE: 99 F | DIASTOLIC BLOOD PRESSURE: 66 MMHG | OXYGEN SATURATION: 93 % | HEART RATE: 88 BPM | HEIGHT: 67 IN | BODY MASS INDEX: 21.97 KG/M2 | WEIGHT: 140 LBS | SYSTOLIC BLOOD PRESSURE: 154 MMHG

## 2018-01-01 VITALS
SYSTOLIC BLOOD PRESSURE: 145 MMHG | DIASTOLIC BLOOD PRESSURE: 49 MMHG | WEIGHT: 145 LBS | HEART RATE: 61 BPM | BODY MASS INDEX: 22.76 KG/M2 | HEIGHT: 67 IN

## 2018-01-01 VITALS
SYSTOLIC BLOOD PRESSURE: 132 MMHG | TEMPERATURE: 98 F | HEART RATE: 60 BPM | RESPIRATION RATE: 20 BRPM | OXYGEN SATURATION: 94 % | DIASTOLIC BLOOD PRESSURE: 87 MMHG

## 2018-01-01 VITALS
SYSTOLIC BLOOD PRESSURE: 118 MMHG | HEART RATE: 60 BPM | BODY MASS INDEX: 21.82 KG/M2 | HEIGHT: 67 IN | WEIGHT: 139 LBS | DIASTOLIC BLOOD PRESSURE: 46 MMHG

## 2018-01-01 VITALS
HEART RATE: 60 BPM | BODY MASS INDEX: 21.97 KG/M2 | SYSTOLIC BLOOD PRESSURE: 126 MMHG | WEIGHT: 140 LBS | HEIGHT: 67 IN | DIASTOLIC BLOOD PRESSURE: 44 MMHG

## 2018-01-01 DIAGNOSIS — S72.001D CLOSED FRACTURE OF NECK OF RIGHT FEMUR WITH ROUTINE HEALING, SUBSEQUENT ENCOUNTER: Primary | ICD-10-CM

## 2018-01-01 DIAGNOSIS — M25.551 RIGHT HIP PAIN: ICD-10-CM

## 2018-01-01 DIAGNOSIS — I49.5 SINUS NODE DYSFUNCTION: ICD-10-CM

## 2018-01-01 DIAGNOSIS — G89.4 CHRONIC PAIN DISORDER: ICD-10-CM

## 2018-01-01 DIAGNOSIS — Z95.0 CARDIAC PACEMAKER IN SITU: ICD-10-CM

## 2018-01-01 DIAGNOSIS — M54.12 CERVICAL RADICULOPATHY: ICD-10-CM

## 2018-01-01 DIAGNOSIS — M51.36 DDD (DEGENERATIVE DISC DISEASE), LUMBAR: Primary | ICD-10-CM

## 2018-01-01 DIAGNOSIS — M51.36 DDD (DEGENERATIVE DISC DISEASE), LUMBAR: ICD-10-CM

## 2018-01-01 DIAGNOSIS — M43.16 SPONDYLOLISTHESIS OF LUMBAR REGION: ICD-10-CM

## 2018-01-01 DIAGNOSIS — S72.001A CLOSED FRACTURE OF NECK OF RIGHT FEMUR, INITIAL ENCOUNTER: Primary | ICD-10-CM

## 2018-01-01 DIAGNOSIS — S72.001A CLOSED FRACTURE OF NECK OF RIGHT FEMUR, INITIAL ENCOUNTER: ICD-10-CM

## 2018-01-01 DIAGNOSIS — I10 ESSENTIAL HYPERTENSION: Chronic | ICD-10-CM

## 2018-01-01 DIAGNOSIS — Z95.0 CARDIAC PACEMAKER: ICD-10-CM

## 2018-01-01 DIAGNOSIS — Z79.891 OPIOID CONTRACT EXISTS: ICD-10-CM

## 2018-01-01 DIAGNOSIS — Z95.0 CARDIAC PACEMAKER IN SITU: Primary | ICD-10-CM

## 2018-01-01 DIAGNOSIS — E78.5 DYSLIPIDEMIA: Chronic | ICD-10-CM

## 2018-01-01 DIAGNOSIS — M25.551 RIGHT HIP PAIN: Primary | ICD-10-CM

## 2018-01-01 DIAGNOSIS — Z98.61 HISTORY OF PTCA: Primary | Chronic | ICD-10-CM

## 2018-01-01 DIAGNOSIS — M79.604 RIGHT LEG PAIN: ICD-10-CM

## 2018-01-01 DIAGNOSIS — Z95.0 PACEMAKER: Chronic | ICD-10-CM

## 2018-01-01 DIAGNOSIS — M47.816 LUMBAR SPONDYLOSIS: Primary | ICD-10-CM

## 2018-01-01 DIAGNOSIS — Z95.1 S/P CABG (CORONARY ARTERY BYPASS GRAFT): Chronic | ICD-10-CM

## 2018-01-01 DIAGNOSIS — M47.816 LUMBAR SPONDYLOSIS: ICD-10-CM

## 2018-01-01 LAB
ABO + RH BLD: NORMAL
ANION GAP SERPL CALC-SCNC: 8 MMOL/L
AV DELAY - FIXED: 300 MSEC
BASOPHILS # BLD AUTO: 0 K/UL
BASOPHILS NFR BLD: 0.3 %
BLD GP AB SCN CELLS X3 SERPL QL: NORMAL
BUN SERPL-MCNC: 31 MG/DL
CALCIUM SERPL-MCNC: 9.7 MG/DL
CHLORIDE SERPL-SCNC: 109 MMOL/L
CO2 SERPL-SCNC: 26 MMOL/L
CREAT SERPL-MCNC: 1.1 MG/DL
DIFFERENTIAL METHOD: ABNORMAL
EOSINOPHIL # BLD AUTO: 0.2 K/UL
EOSINOPHIL NFR BLD: 2.4 %
ERYTHROCYTE [DISTWIDTH] IN BLOOD BY AUTOMATED COUNT: 15.8 %
EST. GFR  (AFRICAN AMERICAN): >60 ML/MIN/1.73 M^2
EST. GFR  (NON AFRICAN AMERICAN): >60 ML/MIN/1.73 M^2
GLUCOSE SERPL-MCNC: 101 MG/DL
HCT VFR BLD AUTO: 34.7 %
HGB BLD-MCNC: 11.5 G/DL
IMPEDANCE RA LEAD (NATIVE): 500 OHMS
IMPEDANCE RA LEAD: 560 OHMS
LYMPHOCYTES # BLD AUTO: 0.5 K/UL
LYMPHOCYTES NFR BLD: 6.7 %
MCH RBC QN AUTO: 30.6 PG
MCHC RBC AUTO-ENTMCNC: 33.1 G/DL
MCV RBC AUTO: 92 FL
MONOCYTES # BLD AUTO: 0.5 K/UL
MONOCYTES NFR BLD: 6.8 %
NEUTROPHILS # BLD AUTO: 6.1 K/UL
NEUTROPHILS NFR BLD: 83.8 %
P/R-WAVE RA LEAD (NATIVE): 5.1 MV
P/R-WAVE RA LEAD: 5 MV
PLATELET # BLD AUTO: 276 K/UL
PMV BLD AUTO: 8.9 FL
POTASSIUM SERPL-SCNC: 4 MMOL/L
PV DELAY - FIXED: 300 MSEC
RBC # BLD AUTO: 3.75 M/UL
SODIUM SERPL-SCNC: 143 MMOL/L
THRESHOLD MS RA LEAD (NATIVE): 0.4 MS
THRESHOLD MS RA LEAD: 0.4 MS
THRESHOLD V RA LEAD (NATIVE): 1.2 V
THRESHOLD V RA LEAD: 0.7 V
WBC # BLD AUTO: 7.3 K/UL

## 2018-01-01 PROCEDURE — 63600175 PHARM REV CODE 636 W HCPCS: Performed by: NURSE ANESTHETIST, CERTIFIED REGISTERED

## 2018-01-01 PROCEDURE — 71046 X-RAY EXAM CHEST 2 VIEWS: CPT | Mod: TC,FY

## 2018-01-01 PROCEDURE — 99214 OFFICE O/P EST MOD 30 MIN: CPT | Mod: PBBFAC,PN | Performed by: PHYSICIAN ASSISTANT

## 2018-01-01 PROCEDURE — 27201423 OPTIME MED/SURG SUP & DEVICES STERILE SUPPLY: Performed by: ORTHOPAEDIC SURGERY

## 2018-01-01 PROCEDURE — 73502 X-RAY EXAM HIP UNI 2-3 VIEWS: CPT | Mod: 26,RT,, | Performed by: RADIOLOGY

## 2018-01-01 PROCEDURE — 99213 OFFICE O/P EST LOW 20 MIN: CPT | Mod: PBBFAC,25,PN | Performed by: ORTHOPAEDIC SURGERY

## 2018-01-01 PROCEDURE — 71000015 HC POSTOP RECOV 1ST HR: Performed by: ORTHOPAEDIC SURGERY

## 2018-01-01 PROCEDURE — 73502 X-RAY EXAM HIP UNI 2-3 VIEWS: CPT | Mod: TC,PO,RT

## 2018-01-01 PROCEDURE — 93280 PM DEVICE PROGR EVAL DUAL: CPT | Mod: PBBFAC,PO | Performed by: INTERNAL MEDICINE

## 2018-01-01 PROCEDURE — 99900103 DSU ONLY-NO CHARGE-INITIAL HR (STAT): Performed by: ORTHOPAEDIC SURGERY

## 2018-01-01 PROCEDURE — 99024 POSTOP FOLLOW-UP VISIT: CPT | Mod: POP,,, | Performed by: ORTHOPAEDIC SURGERY

## 2018-01-01 PROCEDURE — 99999 PR PBB SHADOW E&M-EST. PATIENT-LVL III: CPT | Mod: PBBFAC,,, | Performed by: INTERNAL MEDICINE

## 2018-01-01 PROCEDURE — 99900104 DSU ONLY-NO CHARGE-EA ADD'L HR (STAT): Performed by: ORTHOPAEDIC SURGERY

## 2018-01-01 PROCEDURE — 71000039 HC RECOVERY, EACH ADD'L HOUR: Performed by: ORTHOPAEDIC SURGERY

## 2018-01-01 PROCEDURE — 63600175 PHARM REV CODE 636 W HCPCS: Performed by: ANESTHESIOLOGY

## 2018-01-01 PROCEDURE — 99999 PR PBB SHADOW E&M-EST. PATIENT-LVL III: CPT | Mod: PBBFAC,,, | Performed by: ORTHOPAEDIC SURGERY

## 2018-01-01 PROCEDURE — 27200651 HC AIRWAY, LMA: Performed by: NURSE ANESTHETIST, CERTIFIED REGISTERED

## 2018-01-01 PROCEDURE — 63600175 PHARM REV CODE 636 W HCPCS: Performed by: ORTHOPAEDIC SURGERY

## 2018-01-01 PROCEDURE — 80048 BASIC METABOLIC PNL TOTAL CA: CPT

## 2018-01-01 PROCEDURE — 99215 OFFICE O/P EST HI 40 MIN: CPT | Mod: PBBFAC,25,PN | Performed by: PHYSICIAN ASSISTANT

## 2018-01-01 PROCEDURE — 71046 X-RAY EXAM CHEST 2 VIEWS: CPT | Mod: 26,,, | Performed by: RADIOLOGY

## 2018-01-01 PROCEDURE — 99213 OFFICE O/P EST LOW 20 MIN: CPT | Mod: S$PBB,,, | Performed by: INTERNAL MEDICINE

## 2018-01-01 PROCEDURE — 99900104 DSU ONLY-NO CHARGE-EA ADD'L HR (STAT)

## 2018-01-01 PROCEDURE — 36000710: Performed by: ORTHOPAEDIC SURGERY

## 2018-01-01 PROCEDURE — C1769 GUIDE WIRE: HCPCS | Performed by: ORTHOPAEDIC SURGERY

## 2018-01-01 PROCEDURE — 25000003 PHARM REV CODE 250: Performed by: ANESTHESIOLOGY

## 2018-01-01 PROCEDURE — 99999 PR PBB SHADOW E&M-EST. PATIENT-LVL V: CPT | Mod: PBBFAC,,, | Performed by: PHYSICIAN ASSISTANT

## 2018-01-01 PROCEDURE — 99999 PR PBB SHADOW E&M-EST. PATIENT-LVL IV: CPT | Mod: PBBFAC,,, | Performed by: PHYSICIAN ASSISTANT

## 2018-01-01 PROCEDURE — C1713 ANCHOR/SCREW BN/BN,TIS/BN: HCPCS | Performed by: ORTHOPAEDIC SURGERY

## 2018-01-01 PROCEDURE — 37000008 HC ANESTHESIA 1ST 15 MINUTES: Performed by: ORTHOPAEDIC SURGERY

## 2018-01-01 PROCEDURE — 36415 COLL VENOUS BLD VENIPUNCTURE: CPT

## 2018-01-01 PROCEDURE — 85025 COMPLETE CBC W/AUTO DIFF WBC: CPT

## 2018-01-01 PROCEDURE — 27235 TREAT THIGH FRACTURE: CPT | Mod: RT,,, | Performed by: ORTHOPAEDIC SURGERY

## 2018-01-01 PROCEDURE — 99214 OFFICE O/P EST MOD 30 MIN: CPT | Mod: S$PBB,,, | Performed by: PHYSICIAN ASSISTANT

## 2018-01-01 PROCEDURE — 73552 X-RAY EXAM OF FEMUR 2/>: CPT | Mod: TC,PO,RT

## 2018-01-01 PROCEDURE — 37000009 HC ANESTHESIA EA ADD 15 MINS: Performed by: ORTHOPAEDIC SURGERY

## 2018-01-01 PROCEDURE — 86850 RBC ANTIBODY SCREEN: CPT

## 2018-01-01 PROCEDURE — 27235 PR PERCUT FIX PROX/NECK FEMUR FX: ICD-10-PCS | Mod: RT,,, | Performed by: ORTHOPAEDIC SURGERY

## 2018-01-01 PROCEDURE — 36000711: Performed by: ORTHOPAEDIC SURGERY

## 2018-01-01 PROCEDURE — 99213 OFFICE O/P EST LOW 20 MIN: CPT | Mod: S$PBB,,, | Performed by: PHYSICIAN ASSISTANT

## 2018-01-01 PROCEDURE — 73552 X-RAY EXAM OF FEMUR 2/>: CPT | Mod: 26,RT,, | Performed by: RADIOLOGY

## 2018-01-01 PROCEDURE — D9220A PRA ANESTHESIA: Mod: CRNA,,, | Performed by: NURSE ANESTHETIST, CERTIFIED REGISTERED

## 2018-01-01 PROCEDURE — 99204 OFFICE O/P NEW MOD 45 MIN: CPT | Mod: 57,S$PBB,, | Performed by: ORTHOPAEDIC SURGERY

## 2018-01-01 PROCEDURE — D9220A PRA ANESTHESIA: Mod: ANES,,, | Performed by: ANESTHESIOLOGY

## 2018-01-01 PROCEDURE — 99213 OFFICE O/P EST LOW 20 MIN: CPT | Mod: PBBFAC,PO,25 | Performed by: INTERNAL MEDICINE

## 2018-01-01 PROCEDURE — 71000033 HC RECOVERY, INTIAL HOUR: Performed by: ORTHOPAEDIC SURGERY

## 2018-01-01 PROCEDURE — 99900103 DSU ONLY-NO CHARGE-INITIAL HR (STAT)

## 2018-01-01 DEVICE — IMPLANTABLE DEVICE: Type: IMPLANTABLE DEVICE | Site: HIP | Status: FUNCTIONAL

## 2018-01-01 RX ORDER — LIDOCAINE HCL/PF 100 MG/5ML
SYRINGE (ML) INTRAVENOUS
Status: DISCONTINUED | OUTPATIENT
Start: 2018-01-01 | End: 2018-01-01

## 2018-01-01 RX ORDER — CEFAZOLIN SODIUM 2 G/50ML
2 SOLUTION INTRAVENOUS
Status: COMPLETED | OUTPATIENT
Start: 2018-01-01 | End: 2018-01-01

## 2018-01-01 RX ORDER — PHENYLEPHRINE HYDROCHLORIDE 10 MG/ML
INJECTION INTRAVENOUS
Status: DISCONTINUED | OUTPATIENT
Start: 2018-01-01 | End: 2018-01-01

## 2018-01-01 RX ORDER — FENTANYL CITRATE 50 UG/ML
25 INJECTION, SOLUTION INTRAMUSCULAR; INTRAVENOUS EVERY 5 MIN PRN
Status: DISCONTINUED | OUTPATIENT
Start: 2018-01-01 | End: 2018-01-01 | Stop reason: HOSPADM

## 2018-01-01 RX ORDER — SODIUM CHLORIDE, SODIUM LACTATE, POTASSIUM CHLORIDE, CALCIUM CHLORIDE 600; 310; 30; 20 MG/100ML; MG/100ML; MG/100ML; MG/100ML
INJECTION, SOLUTION INTRAVENOUS CONTINUOUS
Status: DISCONTINUED | OUTPATIENT
Start: 2018-01-01 | End: 2018-01-01 | Stop reason: HOSPADM

## 2018-01-01 RX ORDER — OXYCODONE HYDROCHLORIDE 5 MG/1
5 TABLET ORAL ONCE AS NEEDED
Status: COMPLETED | OUTPATIENT
Start: 2018-01-01 | End: 2018-01-01

## 2018-01-01 RX ORDER — OXYCODONE AND ACETAMINOPHEN 10; 325 MG/1; MG/1
1 TABLET ORAL 3 TIMES DAILY PRN
Qty: 90 TABLET | Refills: 0 | Status: SHIPPED | OUTPATIENT
Start: 2018-01-01 | End: 2018-01-01 | Stop reason: SDUPTHER

## 2018-01-01 RX ORDER — RANOLAZINE 500 MG/1
TABLET, FILM COATED, EXTENDED RELEASE ORAL
Qty: 60 TABLET | Refills: 4 | Status: SHIPPED | OUTPATIENT
Start: 2018-01-01 | End: 2019-01-01

## 2018-01-01 RX ORDER — OXYCODONE AND ACETAMINOPHEN 10; 325 MG/1; MG/1
1 TABLET ORAL 3 TIMES DAILY PRN
Qty: 90 TABLET | Refills: 0 | Status: SHIPPED | OUTPATIENT
Start: 2018-01-01 | End: 2019-01-01 | Stop reason: SDUPTHER

## 2018-01-01 RX ORDER — LIDOCAINE HYDROCHLORIDE 10 MG/ML
0.5 INJECTION, SOLUTION EPIDURAL; INFILTRATION; INTRACAUDAL; PERINEURAL ONCE
Status: COMPLETED | OUTPATIENT
Start: 2018-01-01 | End: 2018-01-01

## 2018-01-01 RX ORDER — KETOROLAC TROMETHAMINE 30 MG/ML
30 INJECTION, SOLUTION INTRAMUSCULAR; INTRAVENOUS ONCE
Status: COMPLETED | OUTPATIENT
Start: 2018-01-01 | End: 2018-01-01

## 2018-01-01 RX ORDER — BENAZEPRIL HYDROCHLORIDE 5 MG/1
TABLET ORAL
Qty: 90 TABLET | Refills: 4 | Status: SHIPPED | OUTPATIENT
Start: 2018-01-01 | End: 2019-01-01

## 2018-01-01 RX ORDER — OXYCODONE AND ACETAMINOPHEN 10; 325 MG/1; MG/1
1 TABLET ORAL 3 TIMES DAILY PRN
Qty: 90 TABLET | Refills: 0 | Status: SHIPPED | OUTPATIENT
Start: 2018-01-01 | End: 2018-01-01

## 2018-01-01 RX ORDER — AMLODIPINE BESYLATE 2.5 MG/1
TABLET ORAL
Qty: 90 TABLET | Refills: 4 | Status: SHIPPED | OUTPATIENT
Start: 2018-01-01

## 2018-01-01 RX ORDER — ACETAMINOPHEN 10 MG/ML
INJECTION, SOLUTION INTRAVENOUS
Status: DISCONTINUED | OUTPATIENT
Start: 2018-01-01 | End: 2018-01-01

## 2018-01-01 RX ORDER — RANOLAZINE 500 MG/1
TABLET, FILM COATED, EXTENDED RELEASE ORAL
Qty: 60 TABLET | Refills: 4 | Status: SHIPPED | OUTPATIENT
Start: 2018-01-01 | End: 2018-01-01 | Stop reason: SDUPTHER

## 2018-01-01 RX ORDER — ONDANSETRON 2 MG/ML
4 INJECTION INTRAMUSCULAR; INTRAVENOUS ONCE AS NEEDED
Status: DISCONTINUED | OUTPATIENT
Start: 2018-01-01 | End: 2018-01-01 | Stop reason: HOSPADM

## 2018-01-01 RX ORDER — CARVEDILOL 3.12 MG/1
TABLET ORAL
Qty: 180 TABLET | Refills: 4 | Status: SHIPPED | OUTPATIENT
Start: 2018-01-01 | End: 2019-01-01

## 2018-01-01 RX ORDER — NITROGLYCERIN 0.4 MG/1
0.4 TABLET SUBLINGUAL EVERY 5 MIN PRN
Qty: 30 TABLET | Refills: 12 | Status: SHIPPED | OUTPATIENT
Start: 2018-01-01 | End: 2019-01-01

## 2018-01-01 RX ORDER — SODIUM CHLORIDE, SODIUM LACTATE, POTASSIUM CHLORIDE, CALCIUM CHLORIDE 600; 310; 30; 20 MG/100ML; MG/100ML; MG/100ML; MG/100ML
INJECTION, SOLUTION INTRAVENOUS CONTINUOUS
Status: CANCELLED | OUTPATIENT
Start: 2018-01-01

## 2018-01-01 RX ORDER — SODIUM CHLORIDE, SODIUM LACTATE, POTASSIUM CHLORIDE, CALCIUM CHLORIDE 600; 310; 30; 20 MG/100ML; MG/100ML; MG/100ML; MG/100ML
INJECTION, SOLUTION INTRAVENOUS CONTINUOUS
Status: DISCONTINUED | OUTPATIENT
Start: 2018-01-01 | End: 2018-01-01

## 2018-01-01 RX ORDER — ONDANSETRON 2 MG/ML
INJECTION INTRAMUSCULAR; INTRAVENOUS
Status: DISCONTINUED | OUTPATIENT
Start: 2018-01-01 | End: 2018-01-01

## 2018-01-01 RX ORDER — DEXAMETHASONE SODIUM PHOSPHATE 4 MG/ML
INJECTION, SOLUTION INTRA-ARTICULAR; INTRALESIONAL; INTRAMUSCULAR; INTRAVENOUS; SOFT TISSUE
Status: DISCONTINUED | OUTPATIENT
Start: 2018-01-01 | End: 2018-01-01

## 2018-01-01 RX ORDER — FENTANYL CITRATE 50 UG/ML
INJECTION, SOLUTION INTRAMUSCULAR; INTRAVENOUS
Status: DISCONTINUED | OUTPATIENT
Start: 2018-01-01 | End: 2018-01-01

## 2018-01-01 RX ORDER — PROPOFOL 10 MG/ML
VIAL (ML) INTRAVENOUS
Status: DISCONTINUED | OUTPATIENT
Start: 2018-01-01 | End: 2018-01-01

## 2018-01-01 RX ADMIN — OXYCODONE HYDROCHLORIDE 5 MG: 5 TABLET ORAL at 01:09

## 2018-01-01 RX ADMIN — PHENYLEPHRINE HYDROCHLORIDE 100 MCG: 10 INJECTION INTRAVENOUS at 12:09

## 2018-01-01 RX ADMIN — OXYCODONE HYDROCHLORIDE 5 MG: 5 TABLET ORAL at 02:09

## 2018-01-01 RX ADMIN — KETOROLAC TROMETHAMINE 30 MG: 30 INJECTION, SOLUTION INTRAMUSCULAR at 03:09

## 2018-01-01 RX ADMIN — ACETAMINOPHEN 1000 MG: 10 INJECTION, SOLUTION INTRAVENOUS at 12:09

## 2018-01-01 RX ADMIN — DEXAMETHASONE SODIUM PHOSPHATE 4 MG: 4 INJECTION, SOLUTION INTRAMUSCULAR; INTRAVENOUS at 12:09

## 2018-01-01 RX ADMIN — ONDANSETRON 4 MG: 2 INJECTION, SOLUTION INTRAMUSCULAR; INTRAVENOUS at 12:09

## 2018-01-01 RX ADMIN — SODIUM CHLORIDE, SODIUM LACTATE, POTASSIUM CHLORIDE, AND CALCIUM CHLORIDE: .6; .31; .03; .02 INJECTION, SOLUTION INTRAVENOUS at 09:09

## 2018-01-01 RX ADMIN — FENTANYL CITRATE 50 MCG: 50 INJECTION, SOLUTION INTRAMUSCULAR; INTRAVENOUS at 12:09

## 2018-01-01 RX ADMIN — PROPOFOL 150 MG: 10 INJECTION, EMULSION INTRAVENOUS at 12:09

## 2018-01-01 RX ADMIN — LIDOCAINE HYDROCHLORIDE 10 MG: 10 INJECTION, SOLUTION EPIDURAL; INFILTRATION; INTRACAUDAL; PERINEURAL at 09:09

## 2018-01-01 RX ADMIN — FENTANYL CITRATE 25 MCG: 50 INJECTION, SOLUTION INTRAMUSCULAR; INTRAVENOUS at 10:09

## 2018-01-01 RX ADMIN — LIDOCAINE HYDROCHLORIDE 100 MG: 20 INJECTION, SOLUTION INTRAVENOUS at 12:09

## 2018-01-01 RX ADMIN — CEFAZOLIN SODIUM 2 G: 2 SOLUTION INTRAVENOUS at 12:09

## 2018-01-05 RX ORDER — RANOLAZINE 500 MG/1
TABLET, FILM COATED, EXTENDED RELEASE ORAL
Qty: 60 TABLET | Refills: 4 | Status: SHIPPED | OUTPATIENT
Start: 2018-01-05 | End: 2018-01-01 | Stop reason: SDUPTHER

## 2018-01-08 ENCOUNTER — TELEPHONE (OUTPATIENT)
Dept: PAIN MEDICINE | Facility: CLINIC | Age: 78
End: 2018-01-08

## 2018-01-08 NOTE — TELEPHONE ENCOUNTER
Spoke with patient. Patient stated he woke up with severe shoulder pain. Patient stated he also is having some back pain but this is chronic. No other symptoms. Appointment scheduled for tomorrow with Rony for further evaluation.

## 2018-01-08 NOTE — TELEPHONE ENCOUNTER
----- Message from Jignesh Myers sent at 1/8/2018 12:14 PM CST -----  Contact: Pt  X_ 1st Request  _ 2nd Request  _ 3rd Request    Who:   TOREY WHITLEY [[id number]]  Why: Patient would like to make an appointment for pain in right shoulder    What Number to Call Back: [phone]    When to Expect a call back: (Before the end of the day)  -- if call after 3:00 call back will be tomorrow.

## 2018-01-24 ENCOUNTER — TELEPHONE (OUTPATIENT)
Dept: PAIN MEDICINE | Facility: CLINIC | Age: 78
End: 2018-01-24

## 2018-01-24 NOTE — TELEPHONE ENCOUNTER
----- Message from Marla Anthony sent at 1/24/2018 10:11 AM CST -----  Contact: Self  Patient is requesting a call back at [phone] (home).  When asked what this is regarding and he said he will tell you.  He sent a message yesterday but no one returned his call.  Thanks

## 2018-01-24 NOTE — TELEPHONE ENCOUNTER
----- Message from Rosaline Araujo sent at 1/23/2018  4:09 PM CST -----  Contact: patient  Patient, geno Lewis, [phone] is calling requesting a callback from Dr. Steve's office.  Patient did not disclose the reason for the call., patient was asked if he wanted to make an appointment, but said he just wanted a callback.    Please advise.  Thanks!

## 2018-01-24 NOTE — TELEPHONE ENCOUNTER
Patient was asking if he has an appointment scheduled. Informed patient that he missed his appointment on 1-9. Appointment rescheduled for tomorrow at 2.

## 2018-01-25 ENCOUNTER — OFFICE VISIT (OUTPATIENT)
Dept: PAIN MEDICINE | Facility: CLINIC | Age: 78
End: 2018-01-25
Payer: MEDICARE

## 2018-01-25 VITALS
DIASTOLIC BLOOD PRESSURE: 70 MMHG | RESPIRATION RATE: 18 BRPM | HEART RATE: 78 BPM | WEIGHT: 154.44 LBS | SYSTOLIC BLOOD PRESSURE: 110 MMHG | TEMPERATURE: 99 F | BODY MASS INDEX: 23.48 KG/M2

## 2018-01-25 DIAGNOSIS — Z79.891 OPIOID CONTRACT EXISTS: ICD-10-CM

## 2018-01-25 DIAGNOSIS — I49.5 SINUS NODE DYSFUNCTION: ICD-10-CM

## 2018-01-25 DIAGNOSIS — M47.816 LUMBAR SPONDYLOSIS: Primary | ICD-10-CM

## 2018-01-25 DIAGNOSIS — Z95.0 CARDIAC PACEMAKER IN SITU: Primary | ICD-10-CM

## 2018-01-25 DIAGNOSIS — M25.511 ACUTE PAIN OF RIGHT SHOULDER: ICD-10-CM

## 2018-01-25 DIAGNOSIS — G89.4 CHRONIC PAIN DISORDER: ICD-10-CM

## 2018-01-25 DIAGNOSIS — M51.36 DDD (DEGENERATIVE DISC DISEASE), LUMBAR: ICD-10-CM

## 2018-01-25 PROCEDURE — 99213 OFFICE O/P EST LOW 20 MIN: CPT | Mod: S$PBB,,, | Performed by: PHYSICIAN ASSISTANT

## 2018-01-25 PROCEDURE — 99999 PR PBB SHADOW E&M-EST. PATIENT-LVL V: CPT | Mod: PBBFAC,,, | Performed by: PHYSICIAN ASSISTANT

## 2018-01-25 PROCEDURE — 99215 OFFICE O/P EST HI 40 MIN: CPT | Mod: PBBFAC,PN | Performed by: PHYSICIAN ASSISTANT

## 2018-01-25 RX ORDER — OXYCODONE AND ACETAMINOPHEN 10; 325 MG/1; MG/1
1 TABLET ORAL 3 TIMES DAILY PRN
Qty: 90 TABLET | Refills: 0 | Status: SHIPPED | OUTPATIENT
Start: 2018-01-01 | End: 2018-01-01

## 2018-01-25 RX ORDER — OXYCODONE AND ACETAMINOPHEN 10; 325 MG/1; MG/1
1 TABLET ORAL 3 TIMES DAILY PRN
Qty: 90 TABLET | Refills: 0 | Status: SHIPPED | OUTPATIENT
Start: 2018-02-27 | End: 2018-01-01 | Stop reason: SDUPTHER

## 2018-01-25 RX ORDER — OXYCODONE AND ACETAMINOPHEN 10; 325 MG/1; MG/1
1 TABLET ORAL 3 TIMES DAILY PRN
Qty: 90 TABLET | Refills: 0 | Status: SHIPPED | OUTPATIENT
Start: 2018-01-28 | End: 2018-02-27

## 2018-01-26 RX ORDER — SODIUM CHLORIDE, SODIUM LACTATE, POTASSIUM CHLORIDE, CALCIUM CHLORIDE 600; 310; 30; 20 MG/100ML; MG/100ML; MG/100ML; MG/100ML
INJECTION, SOLUTION INTRAVENOUS CONTINUOUS
Status: CANCELLED | OUTPATIENT
Start: 2018-02-08
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
## 2018-01-30 ENCOUNTER — OFFICE VISIT (OUTPATIENT)
Dept: PAIN MEDICINE | Facility: CLINIC | Age: 78
End: 2018-01-30
Payer: MEDICARE

## 2018-01-30 ENCOUNTER — HOSPITAL ENCOUNTER (OUTPATIENT)
Dept: RADIOLOGY | Facility: HOSPITAL | Age: 78
Discharge: HOME | End: 2018-01-30
Attending: PHYSICIAN ASSISTANT
Payer: MEDICARE

## 2018-01-30 ENCOUNTER — TELEPHONE (OUTPATIENT)
Dept: PAIN MEDICINE | Facility: CLINIC | Age: 78
End: 2018-01-30

## 2018-01-30 VITALS
WEIGHT: 153.75 LBS | BODY MASS INDEX: 23.38 KG/M2 | DIASTOLIC BLOOD PRESSURE: 90 MMHG | HEART RATE: 80 BPM | SYSTOLIC BLOOD PRESSURE: 130 MMHG | RESPIRATION RATE: 20 BRPM | TEMPERATURE: 98 F

## 2018-01-30 DIAGNOSIS — G89.4 CHRONIC PAIN DISORDER: ICD-10-CM

## 2018-01-30 DIAGNOSIS — Z79.891 OPIOID CONTRACT EXISTS: ICD-10-CM

## 2018-01-30 DIAGNOSIS — M47.816 LUMBAR SPONDYLOSIS: ICD-10-CM

## 2018-01-30 DIAGNOSIS — M50.30 DDD (DEGENERATIVE DISC DISEASE), CERVICAL: Primary | ICD-10-CM

## 2018-01-30 DIAGNOSIS — M50.30 DDD (DEGENERATIVE DISC DISEASE), CERVICAL: ICD-10-CM

## 2018-01-30 PROCEDURE — 72125 CT NECK SPINE W/O DYE: CPT | Mod: 26,,, | Performed by: RADIOLOGY

## 2018-01-30 PROCEDURE — 99213 OFFICE O/P EST LOW 20 MIN: CPT | Mod: S$PBB,,, | Performed by: PHYSICIAN ASSISTANT

## 2018-01-30 PROCEDURE — 1159F MED LIST DOCD IN RCRD: CPT | Mod: ,,, | Performed by: PHYSICIAN ASSISTANT

## 2018-01-30 PROCEDURE — 99214 OFFICE O/P EST MOD 30 MIN: CPT | Mod: PBBFAC,25,PN | Performed by: PHYSICIAN ASSISTANT

## 2018-01-30 PROCEDURE — 72125 CT NECK SPINE W/O DYE: CPT | Mod: TC,PO

## 2018-01-30 PROCEDURE — 1125F AMNT PAIN NOTED PAIN PRSNT: CPT | Mod: ,,, | Performed by: PHYSICIAN ASSISTANT

## 2018-01-30 PROCEDURE — 99999 PR PBB SHADOW E&M-EST. PATIENT-LVL IV: CPT | Mod: PBBFAC,,, | Performed by: PHYSICIAN ASSISTANT

## 2018-01-30 NOTE — TELEPHONE ENCOUNTER
Patient had a CT scan from [facility], he is having pain in right shoulder. Made him a 1pm appointment today with Rony Leahy.

## 2018-01-30 NOTE — TELEPHONE ENCOUNTER
Please let the patient know I reviewed his cervical spine CT results and I believe his pain is coming from foraminal narrowing on the right at C3-4, C4-5 and C5-6.  He is scheduled for lumbar medial branch nerve blocks on 2/8/18.  Please cancel this procedure and schedule a cervical JAMARI to the right in its place.

## 2018-01-30 NOTE — TELEPHONE ENCOUNTER
----- Message from Janet Boykin sent at 1/30/2018 10:31 AM CST -----  Contact: Patient  Patient states that he left a previous message about having this pain in his right shoulder and the orthopedic stated to contact Dr Steve about it.  There was a test done but it ddn't show anything but the pain just won't go away.  It's a continuance pain that is not getting any better.  Can you please call the patient back at [phone].  Thank you

## 2018-01-30 NOTE — TELEPHONE ENCOUNTER
----- Message from Marla Anthony sent at 1/30/2018  3:27 PM CST -----  Contact: Self  Patient states that Jose told him he wants to see him tomorrow since he had his CT scan today but I don't see an appt for tomorrow.  Please call him back at [phone] (home) or [phone].  Thank you

## 2018-01-30 NOTE — TELEPHONE ENCOUNTER
----- Message from Marci Guerra sent at 1/29/2018  3:33 PM CST -----  Contact: self  Patient having pain in right shoulder, states it's important that he get a call back today. Please call back at [phone]

## 2018-01-31 ENCOUNTER — TELEPHONE (OUTPATIENT)
Dept: PAIN MEDICINE | Facility: CLINIC | Age: 78
End: 2018-01-31

## 2018-01-31 ENCOUNTER — OFFICE VISIT (OUTPATIENT)
Dept: PAIN MEDICINE | Facility: CLINIC | Age: 78
End: 2018-01-31
Payer: MEDICARE

## 2018-01-31 VITALS
RESPIRATION RATE: 18 BRPM | DIASTOLIC BLOOD PRESSURE: 68 MMHG | WEIGHT: 153.69 LBS | HEART RATE: 78 BPM | BODY MASS INDEX: 23.36 KG/M2 | SYSTOLIC BLOOD PRESSURE: 120 MMHG | TEMPERATURE: 98 F

## 2018-01-31 DIAGNOSIS — M50.30 DDD (DEGENERATIVE DISC DISEASE), CERVICAL: ICD-10-CM

## 2018-01-31 DIAGNOSIS — Z79.891 OPIOID CONTRACT EXISTS: ICD-10-CM

## 2018-01-31 DIAGNOSIS — M47.816 LUMBAR SPONDYLOSIS: ICD-10-CM

## 2018-01-31 DIAGNOSIS — G89.4 CHRONIC PAIN DISORDER: ICD-10-CM

## 2018-01-31 DIAGNOSIS — M54.12 CERVICAL RADICULOPATHY: Primary | ICD-10-CM

## 2018-01-31 PROCEDURE — 99999 PR PBB SHADOW E&M-EST. PATIENT-LVL V: CPT | Mod: PBBFAC,,, | Performed by: PHYSICIAN ASSISTANT

## 2018-01-31 PROCEDURE — 99215 OFFICE O/P EST HI 40 MIN: CPT | Mod: PBBFAC,PN | Performed by: PHYSICIAN ASSISTANT

## 2018-01-31 PROCEDURE — 1159F MED LIST DOCD IN RCRD: CPT | Mod: ,,, | Performed by: PHYSICIAN ASSISTANT

## 2018-01-31 PROCEDURE — 1125F AMNT PAIN NOTED PAIN PRSNT: CPT | Mod: ,,, | Performed by: PHYSICIAN ASSISTANT

## 2018-01-31 PROCEDURE — 99214 OFFICE O/P EST MOD 30 MIN: CPT | Mod: S$PBB,,, | Performed by: PHYSICIAN ASSISTANT

## 2018-01-31 RX ORDER — SODIUM CHLORIDE, SODIUM LACTATE, POTASSIUM CHLORIDE, CALCIUM CHLORIDE 600; 310; 30; 20 MG/100ML; MG/100ML; MG/100ML; MG/100ML
INJECTION, SOLUTION INTRAVENOUS CONTINUOUS
Status: CANCELLED | OUTPATIENT
Start: 2018-02-08

## 2018-01-31 NOTE — TELEPHONE ENCOUNTER
Our mutual patient Mr. Ruano is scheduled to have an epidural steroid injection on 2/8 and he will need to stop his aspirin and plavix for 7 days. Please advise.
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
## 2018-02-06 ENCOUNTER — TELEPHONE (OUTPATIENT)
Dept: PAIN MEDICINE | Facility: CLINIC | Age: 78
End: 2018-02-06

## 2018-02-06 NOTE — TELEPHONE ENCOUNTER
Spoke to patient and he was just confirming his procedure date and time. I advised he should expect a call tomorrow from the surgery nurse to go over everything with him one more time.

## 2018-02-06 NOTE — TELEPHONE ENCOUNTER
----- Message from Mouna Real sent at 2/6/2018 10:24 AM CST -----  Contact: self  [phone]  Patient called asking if he is schedule for surgery. Thanks!

## 2018-02-07 ENCOUNTER — TELEPHONE (OUTPATIENT)
Dept: PAIN MEDICINE | Facility: CLINIC | Age: 78
End: 2018-02-07

## 2018-02-07 DIAGNOSIS — M50.30 DDD (DEGENERATIVE DISC DISEASE), CERVICAL: Primary | ICD-10-CM

## 2018-02-07 NOTE — TELEPHONE ENCOUNTER
----- Message from Adrianna Cates sent at 2/7/2018  2:58 PM CST -----  Contact: [phone]  Please call in regards to surgery on tomorrow.    Patient contact # [phone]

## 2018-02-08 ENCOUNTER — HOSPITAL ENCOUNTER (OUTPATIENT)
Dept: RADIOLOGY | Facility: HOSPITAL | Age: 78
Discharge: HOME | End: 2018-02-08
Attending: ANESTHESIOLOGY | Admitting: ANESTHESIOLOGY
Payer: MEDICARE

## 2018-02-08 ENCOUNTER — HOSPITAL ENCOUNTER (OUTPATIENT)
Facility: HOSPITAL | Age: 78
Discharge: HOME | End: 2018-02-08
Attending: ANESTHESIOLOGY | Admitting: ANESTHESIOLOGY
Payer: MEDICARE

## 2018-02-08 VITALS
HEIGHT: 68 IN | RESPIRATION RATE: 16 BRPM | HEART RATE: 63 BPM | TEMPERATURE: 98 F | BODY MASS INDEX: 23.49 KG/M2 | DIASTOLIC BLOOD PRESSURE: 59 MMHG | WEIGHT: 155 LBS | SYSTOLIC BLOOD PRESSURE: 120 MMHG | OXYGEN SATURATION: 96 %

## 2018-02-08 DIAGNOSIS — M50.30 DDD (DEGENERATIVE DISC DISEASE), CERVICAL: ICD-10-CM

## 2018-02-08 DIAGNOSIS — M54.12 CERVICAL RADICULOPATHY: ICD-10-CM

## 2018-02-08 DIAGNOSIS — M54.16 LUMBAR RADICULOPATHY: Primary | ICD-10-CM

## 2018-02-08 PROCEDURE — A4216 STERILE WATER/SALINE, 10 ML: HCPCS | Mod: PO | Performed by: ANESTHESIOLOGY

## 2018-02-08 PROCEDURE — 99152 MOD SED SAME PHYS/QHP 5/>YRS: CPT | Mod: ,,, | Performed by: ANESTHESIOLOGY

## 2018-02-08 PROCEDURE — 76000 FLUOROSCOPY <1 HR PHYS/QHP: CPT | Mod: TC,PO

## 2018-02-08 PROCEDURE — 62321 NJX INTERLAMINAR CRV/THRC: CPT | Mod: PO | Performed by: ANESTHESIOLOGY

## 2018-02-08 PROCEDURE — 62321 NJX INTERLAMINAR CRV/THRC: CPT | Mod: ,,, | Performed by: ANESTHESIOLOGY

## 2018-02-08 PROCEDURE — 25500020 PHARM REV CODE 255: Mod: PO | Performed by: ANESTHESIOLOGY

## 2018-02-08 PROCEDURE — 25000003 PHARM REV CODE 250: Mod: PO | Performed by: ANESTHESIOLOGY

## 2018-02-08 PROCEDURE — 63600175 PHARM REV CODE 636 W HCPCS: Mod: PO | Performed by: ANESTHESIOLOGY

## 2018-02-08 RX ORDER — MIDAZOLAM HYDROCHLORIDE 2 MG/2ML
INJECTION, SOLUTION INTRAMUSCULAR; INTRAVENOUS
Status: DISCONTINUED | OUTPATIENT
Start: 2018-02-08 | End: 2018-02-08 | Stop reason: HOSPADM

## 2018-02-08 RX ORDER — SODIUM CHLORIDE, SODIUM LACTATE, POTASSIUM CHLORIDE, CALCIUM CHLORIDE 600; 310; 30; 20 MG/100ML; MG/100ML; MG/100ML; MG/100ML
INJECTION, SOLUTION INTRAVENOUS CONTINUOUS
Status: DISCONTINUED | OUTPATIENT
Start: 2018-02-08 | End: 2018-02-08 | Stop reason: HOSPADM

## 2018-02-08 RX ORDER — SODIUM CHLORIDE 9 MG/ML
INJECTION, SOLUTION INTRAMUSCULAR; INTRAVENOUS; SUBCUTANEOUS
Status: DISCONTINUED | OUTPATIENT
Start: 2018-02-08 | End: 2018-02-08 | Stop reason: HOSPADM

## 2018-02-08 RX ORDER — METHYLPREDNISOLONE ACETATE 80 MG/ML
INJECTION, SUSPENSION INTRA-ARTICULAR; INTRALESIONAL; INTRAMUSCULAR; SOFT TISSUE
Status: DISCONTINUED | OUTPATIENT
Start: 2018-02-08 | End: 2018-02-08 | Stop reason: HOSPADM

## 2018-02-08 RX ORDER — LIDOCAINE HYDROCHLORIDE 10 MG/ML
INJECTION, SOLUTION EPIDURAL; INFILTRATION; INTRACAUDAL; PERINEURAL
Status: DISCONTINUED | OUTPATIENT
Start: 2018-02-08 | End: 2018-02-08 | Stop reason: HOSPADM

## 2018-02-08 RX ADMIN — SODIUM CHLORIDE, SODIUM LACTATE, POTASSIUM CHLORIDE, CALCIUM CHLORIDE: 600; 310; 30; 20 INJECTION, SOLUTION INTRAVENOUS at 02:02
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
## 2018-02-08 NOTE — OP NOTE
PROCEDURE DATE: 2/8/2018    Procedure: C7-T1 cervical interlaminar epidural steroid injection under utilizing fluoroscopy.    Diagnosis: Cervical Radiculopathy    POSTOP DIAGNOSIS: SAME    Physician: Roney Steve MD    Medications injected:  Methylprednisone 80mg followed by a slow injection of 4 mL sterile, preservative-free normal saline.    Local anesthetic used: Lidocaine 1%, 4 ml.    Sedation Medications: 4mg versed    Complications:  none    Estimated blood loss: none    Technique:  A time-out was taken to identify patient and procedure prior to starting the procedure.  With the patient laying in a prone position with the neck in a mid-flexed forward position, the area was prepped and draped in the usual sterile fashion using ChloraPrep and a fenestrated drape.  The area was determined under AP fluoroscopic guidance.  Local anesthetic was given using a 25-gauge 1.5 inch needle by raising a wheal and then infiltrating ventrally.  A 3.5 inch 20-gauge Touhy needle was introduced under fluoroscopic guidance to meet the lamina of C7.  The needle was then hinged under the lamina then advanced using loss of resistance technique.  Once the tip of the needle was in the desired position, the contrast dye Omnipaque was injected to determine placement and no uptake.  The steroid was then injected slowly followed by a slow injection of 4 mL of the sterile preservative-free normal saline.  The patient tolerated the procedure well.    The patient was monitored after the procedure and was given post-procedure and discharge instructions to follow at home. The patient was discharged in a stable condition.

## 2018-02-08 NOTE — DISCHARGE SUMMARY
[facility]  Discharge Note  Short Stay    Admit Date: 2/8/2018    Discharge Date: 2/8/2018    Attending Physician: Roney Steve MD     Discharge Provider: Roney Steve    Diagnoses:  Active Hospital Problems    Diagnosis  POA    *Lumbar radiculopathy [M54.16]  Yes      Resolved Hospital Problems    Diagnosis Date Resolved POA   No resolved problems to display.       Discharged Condition: good    Final Diagnoses: Lumbar radiculopathy [M54.16]    Disposition: Home or Self Care    Hospital Course: no complications, uneventful    Outcome of Hospitalization, Treatment, Procedure, or Surgery:  Patient was admitted for outpatient procedure. The patient underwent procedure without complications and are discharged home    Follow up/Patient Instructions:  Follow up as scheduled/Patient has received instructions and follow up date    Medications:  Continue previous medications      Discharge Procedure Orders  Call MD for:  temperature >100.4     Call MD for:  severe uncontrolled pain     Call MD for:  redness, tenderness, or signs of infection (pain, swelling, redness, odor or green/yellow discharge around incision site)     Call MD for:  severe persistent headache     No dressing needed           Discharge Procedure Orders (must include Diet, Follow-up, Activity):    Discharge Procedure Orders (must include Diet, Follow-up, Activity)  Call MD for:  temperature >100.4     Call MD for:  severe uncontrolled pain     Call MD for:  redness, tenderness, or signs of infection (pain, swelling, redness, odor or green/yellow discharge around incision site)     Call MD for:  severe persistent headache     No dressing needed

## 2018-02-08 NOTE — DISCHARGE INSTRUCTIONS
Recovery After Procedural Sedation (Adult)  You have been given medicine by vein to make you sleep during your surgery. This may have included both a pain medicine and sleeping medicine. Most of the effects have worn off. But you may still have some drowsiness for the next 6 to 8 hours.  Home care  Follow these guidelines when you get home:  · For the next 8 hours, you should be watched by a responsible adult. This person should make sure your condition is not getting worse.  · Don't drink any alcohol for the next 24 hours.  · Don't drive, operate dangerous machinery, or make important business or personal decisions during the next 24 hours.  Note: Your healthcare provider may tell you not to take any medicine by mouth for pain or sleep in the next 4 hours. These medicines may react with the medicines you were given in the hospital. This could cause a much stronger response than usual.  Follow-up care  Follow up with your healthcare provider if you are not alert and back to your usual level of activity within 12 hours.  When to seek medical advice  Call your healthcare provider right away if any of these occur:  · Drowsiness gets worse  · Weakness or dizziness gets worse  · Repeated vomiting  · You can't be awakened   Date Last Reviewed: 10/18/2016  © 7152-8666 [employer]. [address] [place] [address], [place] [address]. All rights reserved. This information is not intended as a substitute for professional medical care. Always follow your healthcare professional's instructions.        Has met unit/department guidelines for discharge from each phase of the post procedure continuum.    Home care instructions  Apply ice pack to the injection site for 20 minutes periods for the first 24 hrs for soreness/discomfort at injection site DO NOT USE HEAT FOR 24 HOURS  Keep site clean and dry for 24 hours, remove bandaid when desired  Do not drive until tomorrow  Take care when walking after a cervical  injection  Avoid strenuous activities for 2 days  Make take 2 weeks to feel the full effects   Resume home medication as prescribed today  Resume Aspirin, Plavix, or Coumadin the day after the procedure unless otherwise instructed.    SEE IMMEDIATE MEDICAL HELP FOR:  Severe increase in your usual pain or appearance of new pain  Prolonged or increasing weakness or numbness in the legs or arms  Drainage, redness, active bleeding, or increased swelling at the injection site  Temperature over 100.0 degrees F.  Headache that increases when your head is upright and decreases when you lie flat    CALL 911 OR GO DIRECTLY TO EMERGENCY DEPARTMENT FOR:  Shortness of breath, chest pain, or problems breathing

## 2018-03-03 PROBLEM — L03.90 CELLULITIS: Status: ACTIVE | Noted: 2018-01-01

## 2018-03-05 PROBLEM — R63.4 WEIGHT LOSS, UNINTENTIONAL: Status: ACTIVE | Noted: 2018-01-01

## 2018-03-08 PROBLEM — R65.20 SEVERE SEPSIS: Status: RESOLVED | Noted: 2018-01-01 | Resolved: 2018-01-01

## 2018-03-08 PROBLEM — R65.20 SEVERE SEPSIS: Status: ACTIVE | Noted: 2018-01-01

## 2018-03-08 PROBLEM — M79.89 LEG SWELLING: Status: ACTIVE | Noted: 2018-01-01

## 2018-03-08 PROBLEM — A41.9 SEVERE SEPSIS: Status: ACTIVE | Noted: 2018-01-01

## 2018-03-08 PROBLEM — A41.9 SEVERE SEPSIS: Status: RESOLVED | Noted: 2018-01-01 | Resolved: 2018-01-01

## 2018-03-08 PROBLEM — M79.89 LEG SWELLING: Status: RESOLVED | Noted: 2018-01-01 | Resolved: 2018-01-01
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
## 2018-04-19 NOTE — PROGRESS NOTES
Subjective:    Patient ID:  Dung Lewis is a 77 y.o. male who presents for follow-up of Coronary Artery Disease (Annual f/u ) and Hypertension      HPI  Here for f/u CABG/PPM/PCI. Recovering from bacteremia due to URI. No angina. Still feels weak.  No angina    Review of Systems   Constitution: Negative for malaise/fatigue.   Eyes: Negative for blurred vision.   Cardiovascular: Negative for chest pain, claudication, cyanosis, dyspnea on exertion, irregular heartbeat, leg swelling, near-syncope, orthopnea, palpitations, paroxysmal nocturnal dyspnea and syncope.   Respiratory: Negative for cough and shortness of breath.    Hematologic/Lymphatic: Does not bruise/bleed easily.   Musculoskeletal: Negative for back pain, falls, joint pain, muscle cramps, muscle weakness and myalgias.   Gastrointestinal: Negative for abdominal pain, change in bowel habit, nausea and vomiting.   Genitourinary: Negative for urgency.   Neurological: Negative for dizziness, focal weakness and light-headedness.        Objective:    Physical Exam   Constitutional: He is oriented to person, place, and time. He appears well-developed and well-nourished. He is cooperative.   HENT:   Head: Normocephalic and atraumatic.   Eyes: Conjunctivae are normal. Right eye exhibits no exudate. Left eye exhibits no exudate.   Neck: Normal range of motion. Neck supple. Normal carotid pulses and no JVD present. Carotid bruit is not present. No thyromegaly present.   Cardiovascular: Normal rate, regular rhythm, normal heart sounds and intact distal pulses.    Pulses:       Carotid pulses are 2+ on the right side, and 2+ on the left side.       Radial pulses are 2+ on the right side, and 2+ on the left side.        Dorsalis pedis pulses are 2+ on the right side, and 2+ on the left side.        Posterior tibial pulses are 2+ on the right side, and 2+ on the left side.   Pacer site clean and dry, well healed.  Well healed midline sternal incision.      Pulmonary/Chest: Effort normal and breath sounds normal.   Abdominal: Soft. Bowel sounds are normal.   Musculoskeletal: Normal range of motion. He exhibits no edema.   Neurological: He is alert and oriented to person, place, and time. Gait normal.   Skin: Skin is warm, dry and intact. No cyanosis. Nails show no clubbing.   Psychiatric: He has a normal mood and affect. His speech is normal and behavior is normal. Judgment and thought content normal.             ..    Chemistry        Component Value Date/Time     03/19/2018 1643    K 4.1 03/19/2018 1643     03/19/2018 1643    CO2 25 03/19/2018 1643    BUN 23 (H) 03/19/2018 1643    CREATININE 0.93 03/19/2018 1643     03/19/2018 1643        Component Value Date/Time    CALCIUM 9.0 03/19/2018 1643    ALKPHOS 72 03/19/2018 1643    AST 28 03/19/2018 1643    ALT 48 (H) 03/19/2018 1643    BILITOT 0.4 03/19/2018 1643    ESTGFRAFRICA >60 03/19/2018 1643    EGFRNONAA >60 03/19/2018 1643            ..  Lab Results   Component Value Date    CHOL 121 03/23/2017    CHOL 130 04/06/2016    CHOL 135 07/10/2015     Lab Results   Component Value Date    HDL 36 (L) 03/23/2017    HDL 41 04/06/2016    HDL 44 07/10/2015     Lab Results   Component Value Date    LDLCALC 69.0 03/23/2017    LDLCALC 73.6 04/06/2016    LDLCALC 74.4 07/10/2015     Lab Results   Component Value Date    TRIG 80 03/23/2017    TRIG 77 04/06/2016    TRIG 83 07/10/2015     Lab Results   Component Value Date    CHOLHDL 29.8 03/23/2017    CHOLHDL 31.5 04/06/2016    CHOLHDL 32.6 07/10/2015     ..  Lab Results   Component Value Date    WBC 15.13 (H) 03/19/2018    HGB 11.5 (L) 03/19/2018    HCT 34.6 (L) 03/19/2018    MCV 91 03/19/2018     03/19/2018       Test(s) Reviewed  I have reviewed the following in detail:  [] Stress test   [x] Angiography   [x] Echocardiogram   [x] Labs   [] Other:       Assessment:         ICD-10-CM ICD-9-CM   1. History of PTCA Z98.61 V45.82   2. S/P CABG (CABG x 5  (07/2006) Z95.1 V45.81   3. Essential hypertension I10 401.9   4. Dyslipidemia E78.5 272.4     Problem List Items Addressed This Visit     Dyslipidemia (Chronic)    Essential hypertension (Chronic)    History of PTCA - Primary (Chronic)    Overview     CX- ostial promus 3x18      Previous PCI:                                                                S/P stent to LCX, 08/23/2011                                                 S/P coated stent to LAD, 11/07/2005                                          S/P coated stent to ramus, 11/07/2005                                        S/P coated stent to LCX, 11/07/2005             S/P CABG (CABG x 5 (07/2006) (Chronic)    Overview     CABG x 5 (07/2006 LIMA to LAD (single graft), SVG to OM1 (single graft),     SVG to D1 (Y graft), SVG to LAD (Y graft), SVG to RCA (single graft))        Status (08/23/2011  LIMA to LAD (single graft) patent, 08/11/2010            LIMA to LAD (single graft) occluded, 04/25/2008  HERNANDEZ to LAD (single         graft) occluded                  Plan:           Return to clinic 6 months   Low level/low impact aerobic exercise 5x's/wk. Heart healthy diet and risk factor modification.    See labs and med orders.

## 2018-04-26 NOTE — TELEPHONE ENCOUNTER
When doing pre op call for upcoming procedure, patient states he was recently hospitalized with pneumonia and still didn't feel well. He states he would like to cancel and reschedule. Instructed to call the office when ready to reschedule/

## 2018-05-30 NOTE — TELEPHONE ENCOUNTER
----- Message from Melanie Santoro sent at 5/29/2018  4:11 PM CDT -----  Contact: patient   Patient calling to speak to the Nurse regarding his appointment. He states that he had an appointment scheduled for this Friday. There is no record of this appointment. Please advise.   Call back    Thanks!
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
## 2018-07-05 PROBLEM — I50.9 CHF EXACERBATION: Status: ACTIVE | Noted: 2018-01-01

## 2018-07-06 PROBLEM — R91.8 LUNG MASS: Status: ACTIVE | Noted: 2018-01-01

## 2018-07-10 PROBLEM — J90 PLEURAL EFFUSION: Status: ACTIVE | Noted: 2018-01-01

## 2018-07-10 PROBLEM — R07.9 CHEST PAIN: Status: ACTIVE | Noted: 2018-01-01

## 2018-07-10 PROBLEM — J90 PLEURAL EFFUSION, BILATERAL: Status: ACTIVE | Noted: 2018-01-01

## 2018-07-10 PROBLEM — J90 PLEURAL EFFUSION: Status: RESOLVED | Noted: 2018-01-01 | Resolved: 2018-01-01

## 2018-07-10 PROBLEM — J90 PLEURAL EFFUSION, BILATERAL: Status: RESOLVED | Noted: 2018-01-01 | Resolved: 2018-01-01

## 2018-07-23 PROBLEM — K21.9 GASTROESOPHAGEAL REFLUX DISEASE WITHOUT ESOPHAGITIS: Chronic | Status: ACTIVE | Noted: 2018-01-01

## 2018-07-23 PROBLEM — J43.2 CENTRILOBULAR EMPHYSEMA: Chronic | Status: ACTIVE | Noted: 2018-01-01

## 2018-07-23 PROBLEM — J90 PLEURAL EFFUSION, LEFT: Status: ACTIVE | Noted: 2018-01-01

## 2018-07-23 PROBLEM — I50.23 ACUTE ON CHRONIC SYSTOLIC CONGESTIVE HEART FAILURE: Chronic | Status: ACTIVE | Noted: 2018-01-01

## 2018-07-23 PROBLEM — E03.9 ACQUIRED HYPOTHYROIDISM: Chronic | Status: ACTIVE | Noted: 2018-01-01

## 2018-07-24 PROBLEM — E44.0 MODERATE MALNUTRITION: Status: ACTIVE | Noted: 2018-01-01

## 2018-07-24 PROBLEM — J81.0 PULMONARY EDEMA, ACUTE: Status: ACTIVE | Noted: 2018-01-01

## 2018-07-26 PROBLEM — E44.0 MODERATE MALNUTRITION: Status: ACTIVE | Noted: 2018-01-01

## 2018-07-31 NOTE — TELEPHONE ENCOUNTER
----- Message from Adrianna Cates sent at 7/31/2018 12:56 PM CDT -----  Contact: [phone] or [phone]  Patient stated he need a hospital follow up.    Discharge date 7/27/18  Please call [phone] or [phone]

## 2018-08-03 NOTE — TELEPHONE ENCOUNTER
"When doing pre op call, patient's daughter stated that he had been to [facility] in last few weeks to "have fluid drained from his lungs" She also states that he has another one scheduled "sometime next week". Unable to do full preop assessment because family was getting ready to go out of town.   Will this affect his procedure?  "

## 2018-08-03 NOTE — TELEPHONE ENCOUNTER
If there's no infection involved with this, we should be fine to proceed. This is recurring pleural effusion, the question is whether he'll be able to lay on his chest for the procedure and breath okay, up to him if he wants to reschedule.

## 2018-08-05 PROBLEM — R42 DIZZINESS: Status: ACTIVE | Noted: 2018-01-01

## 2018-08-06 NOTE — TELEPHONE ENCOUNTER
"Patient will have to cancel his procedure for today because he is hospitalized, with what appears to be a fluid issue. Patient stated that he was having "fluid drained from his lungs". He was instructed to call the office when he was discharged home and feeling better to reschedule his procedure.  "

## 2018-08-07 PROBLEM — R05.9 COUGH: Status: ACTIVE | Noted: 2018-01-01

## 2018-08-13 NOTE — TELEPHONE ENCOUNTER
----- Message from Cyn Winters sent at 8/13/2018 12:26 PM CDT -----  Contact: Priyanka/[facility]  Priyanka is requesting a refill on medication(nitroGLYCERIN (NITROSTAT) 0.4 MG SL tablet) for pt  Please call pt to advise  Call Back   Thanks      [facility] - [place] - [address]  [place] [address]  Phone: [phone] Fax: [phone]

## 2018-08-22 NOTE — TELEPHONE ENCOUNTER
----- Message from Hien Chacko sent at 8/22/2018  2:03 PM CDT -----  Contact: Dung  Type: Needs Medical Advice    Who Called:  patient  Symptoms (please be specific):  dizziness  How long has patient had these symptoms:  Several months  Pharmacy name and phone #:    [facility] - [place] - [address]  [address]  [place] [address]  Phone: [phone] Fax: [phone]  Best Call Back Number: [phone]  Additional Information:  Patient called earlier but no return call as of yet. States noticed on bottle of Rx amLODIPine (NORVASC) 2.5 MG tablet that can cause dizziness. Asking for advise on what should do regarding medication. Thanks!

## 2018-08-22 NOTE — TELEPHONE ENCOUNTER
Spoke to flakito informed her Dr. Christina is aware of pt. BP and stated pt. Amlodipine does not need adjusting and pt. Need to see PCP. flakito verbalized understanding.

## 2018-08-22 NOTE — TELEPHONE ENCOUNTER
----- Message from Tamra Hunt sent at 8/22/2018 11:08 AM CDT -----  Contact: self  Type: Needs Medical Advice    Who Called:  self  Symptoms (please be specific):  dizziness  How long has patient had these symptoms:  Since he started the medication  Best Call Back Number: [phone]  Additional Information: patient states the amLODIPine (NORVASC) 2.5 MG tablet side effects says it may cause dizziness. Please call patient. Thanks!

## 2018-08-22 NOTE — TELEPHONE ENCOUNTER
----- Message from Juanjo Franklin sent at 8/22/2018 12:44 PM CDT -----  Contact: [facility]/Indigo Sood called in regarding the attached patient.  Indigo stated patient is complaining of being dizzy.  Blood Pressure is 120/50.  Indigo would like to see if patients blood pressure medication can be adjusted?  Indigo's call back number is [phone] ([phone])

## 2018-08-22 NOTE — TELEPHONE ENCOUNTER
Spoke to pt. Informed him to take his blood pressure and call back with the results. Pt. Verbalized understanding.

## 2018-08-23 NOTE — TELEPHONE ENCOUNTER
Spoke with patient his  bp today is running 146/46 heart 63.  Questions answered. Will get further advice from pcp about being dizzy.

## 2018-08-27 NOTE — TELEPHONE ENCOUNTER
----- Message from Sherrie Avalos sent at 8/27/2018  9:40 AM CDT -----  Contact: Self  Patient states medication amLODIPine (NORVASC) 2.5 MG tablet makes him dizzy all the time and he needs something else and wants to talk to the nurse     Please call back [phone]        [facility] - [place] - [address]  [address]  [place] [address]  Phone: [phone] Fax: [phone]

## 2018-08-27 NOTE — TELEPHONE ENCOUNTER
Spoke to pt. Stated he has been having dizziness and wants to know what to do. Asked pt. If he consulted with PCP about dizziness. Pt. Stated no he has not spoke to PCP about dizziness. Advise pt. To contact PCP to get evaluated. Pt. Verbalized understanding.

## 2018-08-27 NOTE — TELEPHONE ENCOUNTER
----- Message from Hien Younger sent at 8/27/2018  2:39 PM CDT -----  Contact: self  Patient [phone] or [phone] (cell) is calling to discuss changing his medication/please call patient to discuss

## 2018-09-24 NOTE — TELEPHONE ENCOUNTER
----- Message from Marci Guerra sent at 9/24/2018  4:13 PM CDT -----  Contact: self  Patient would like a call back at [phone] --personal

## 2018-09-25 NOTE — TELEPHONE ENCOUNTER
Please let the patient know that it is possible he has a fracture of the femoral neck and let's get him in with orthopedics asap.  Referral placed.

## 2018-09-25 NOTE — TELEPHONE ENCOUNTER
Spoke with the patient and orthopedic appointment scheduled for 9/26. Also spoke with the son and confirmed appointment.

## 2018-09-26 PROBLEM — S72.001A CLOSED FRACTURE OF NECK OF RIGHT FEMUR: Status: ACTIVE | Noted: 2018-01-01

## 2018-09-26 NOTE — LETTER
September 26, 2018      EMILIA Teresa  [address] [facility] [address] [place] [address]           [facility]  [address] [facility] [address] [place] [address]  Phone: [phone]          Patient: Dung Lewis   MR Number: [id number]   YOB: 1940   Date of Visit: 9/26/2018       Dear Jose Leahy:    Thank you for referring Dung Lewis to me for evaluation. Attached you will find relevant portions of my assessment and plan of care.    If you have questions, please do not hesitate to call me. I look forward to following Dung Lewis along with you.    Sincerely,    Santos Louise MD    Enclosure  CC:  No Recipients    If you would like to receive this communication electronically, please contact [email or web address] or [phone] to request more information on [employer] access.    For providers and/or their staff who would like to refer a patient to [facility], please contact us through our one-stop-shop provider referral line, Cinthia Mendoza, at [phone].    If you feel you have received this communication in error or would no longer like to receive these types of communications, please e-mail [email or web address]

## 2018-09-26 NOTE — PROGRESS NOTES
Past Medical History:   Diagnosis Date    Anticoagulant long-term use     ASA 81mg, plavix    Anxiety     Arthritis     Cancer     throat    CHF (congestive heart failure)     Coronary artery disease CABG    6 grafts, stent after surgery    Emphysema of lung     Encounter for blood transfusion     GERD (gastroesophageal reflux disease)     History of thoracentesis 07/2018    Hypertension     Myocardial infarction     Pacemaker     Thyroid disease        Past Surgical History:   Procedure Laterality Date    CARDIAC PACEMAKER PLACEMENT      CATARACT EXTRACTION, BILATERAL  12/2012    COLONOSCOPY      CORONARY ARTERY BYPASS GRAFT  2007?    x6 vessels    JAMARI-TRANSFORAMINAL L4 Bilateral 12/12/2014    [facility]    INJECTION, FACET JOINT Bilateral 9/4/2012    [facility]    INJECTION, STEROID, SPINE, LUMBAR, EPIDURAL N/A 2/4/2013    [facility]    INJECTION-STEROID-EPIDURAL-CERVICAL Right 2/8/2018    [facility]    PROSTATE SURGERY      remove toe nail      SALIVARY GLAND SURGERY      TRIAL-STIMULATOR-DORSAL COLUMN N/A 10/18/2016    [facility]       Current Outpatient Medications   Medication Sig    amLODIPine (NORVASC) 2.5 MG tablet TAKE 1 TABLET BY MOUTH ONCE DAILY FOR HIGH BLOOD PRESSURE    aspirin 81 mg Tab Take 1 tablet by mouth Every morning. Every morning    benazepril (LOTENSIN) 5 MG tablet TAKE 1 TABLET BY MOUTH DAILY FOR HIGH BLOOD PRESSURE    carisoprodol (SOMA) 350 MG tablet TK 1 T PO Q 6 H PRN    carvedilol (COREG) 3.125 MG tablet TAKE 1 TABLET BY MOUTH 2 TIMES DAILY BEFORE MEALS - FOR HEART OR HIGH BLOOD PRESSURE    clopidogrel (PLAVIX) 75 mg tablet TAKE 1 TABLET BY MOUTH DAILY (BLOOD THINNER)    furosemide (LASIX) 20 MG tablet Take 1 tablet (20 mg total) by mouth once daily.    levothyroxine  (SYNTHROID) 100 MCG tablet Take 1 tablet by mouth Every morning. Every morning    lorazepam (ATIVAN) 1 MG tablet TAKE 1 TABLET BY MOUTH EACH EVENING AS NEEDED FOR ANXIETY    nitroGLYCERIN (NITROSTAT) 0.4 MG SL tablet Place 1 tablet (0.4 mg total) under the tongue every 5 (five) minutes as needed for Chest pain. As directed PRN    [START ON 10/5/2018] oxyCODONE-acetaminophen (PERCOCET)  mg per tablet Take 1 tablet by mouth 3 (three) times daily as needed for Pain.    [START ON 11/4/2018] oxyCODONE-acetaminophen (PERCOCET)  mg per tablet Take 1 tablet by mouth 3 (three) times daily as needed for Pain.    [START ON 12/4/2018] oxyCODONE-acetaminophen (PERCOCET)  mg per tablet Take 1 tablet by mouth 3 (three) times daily as needed for Pain.    RANEXA 500 mg Tb12 TAKE 1 TABLET BY MOUTH TWICE DAILY FOR CHEST PAIN PREVENTION    simvastatin (ZOCOR) 40 MG tablet TAKE 1 TABLET BY MOUTH EACH EVENING FOR CHOLESTEROL    temazepam (RESTORIL) 30 mg capsule TAKE 1 CAPSULE BY MOUTH EACH NIGHT AT BEDTIME BEFORE SLEEP    albuterol (PROVENTIL) 2.5 mg /3 mL (0.083 %) nebulizer solution Take 3 mLs (2.5 mg total) by nebulization every 6 (six) hours as needed for Wheezing. Rescue    fluticasone-vilanterol (BREO) 100-25 mcg/dose diskus inhaler Inhale 1 puff into the lungs once daily. Controller     No current facility-administered medications for this visit.        Review of patient's allergies indicates:   Allergen Reactions    No known drug allergies        Family History   Problem Relation Age of Onset    Cancer Sister         pancreatic    Heart disease Sister     Alcohol abuse Brother     No Known Problems Daughter     No Known Problems Sister     No Known Problems Sister     No Known Problems Sister     No Known Problems Sister     Heart disease Brother     No Known Problems Son     No Known Problems Son     No Known Problems Son     No Known Problems Son     No Known Problems Son         Social History     Socioeconomic History    Marital status:      Spouse name: Not on file    Number of children: Not on file    Years of education: Not on file    Highest education level: Not on file   Social Needs    Financial resource strain: Not on file    Food insecurity - worry: Not on file    Food insecurity - inability: Not on file    Transportation needs - medical: Not on file    Transportation needs - non-medical: Not on file   Occupational History    Not on file   Tobacco Use    Smoking status: Current Every Day Smoker     Packs/day: 0.00     Years: 50.00     Pack years: 0.00     Types: Cigarettes     Start date: 12/3/1960    Smokeless tobacco: Never Used    Tobacco comment: 2-3 cigarettes daily   Substance and Sexual Activity    Alcohol use: No    Drug use: Yes     Types: Marijuana     Comment: not prescribed    Sexual activity: Not on file   Other Topics Concern    Not on file   Social History Narrative    Not on file       Chief Complaint:   Chief Complaint   Patient presents with    Hip Pain     RIGHT HIP FRACTURE       History of present illness:  78-year-old male who fell about a week ago and his den.  Landed on his right hip.  Has had pain in his right leg since then.  Was seen at pain management yesterday where x-rays of his right femur showed possible injury to the right hip that could not be ruled out.  Patient has pain of the right hip that he rates as an 8/10.  Difficulty bearing weight.      Review of Systems:    Constitution: Negative for chills, fever, and sweats.  Negative for unexplained weight loss.    HENT:  Negative for headaches and blurry vision.    Cardiovascular:Negative for chest pain or irregular heart beat. Negative for hypertension.    Respiratory:  Negative for cough and shortness of breath.    Gastrointestinal: Negative for abdominal pain, heartburn, melena, nausea, and vomitting.    Genitourinary:  Negative bladder incontinence and  dysuria.    Musculoskeletal:  See HPI    Neurological: Negative for numbness.    Psychiatric/Behavioral: Negative for depression.  The patient is not nervous/anxious.      Endocrine: Negative for polyuria    Hematologic/Lymphatic: Negative for bleeding problem.  Does not bruise/bleed easily.    Skin: Negative for poor would healing and rash      Physical Examination:    Vital Signs:    Vitals:    09/26/18 1418   BP: (!) 118/46   Pulse: 60       Body mass index is 21.77 kg/m².    This a well-developed, well nourished patient in no acute distress.  They are alert and oriented and cooperative to examination.  Pt.  Comes in in a wheelchair    Examination was right leg shows mild pain with hip radiate drive motion. No pain at the knee.  Some pain around the thigh.  Palpable distal pulses.  Intact light touch sensation.  No rashes or erythema.  No significant bruising or obvious deformity.    Examination of the patient's left hip shows full range of motion with flexion to 160°, extension to 0, external rotation to 50°, internal rotation of 15°, abduction of 50°, adduction of 15°. Skin has no rashes or bruising.Patient has no pain with hip range of motion. Nontender to palpation over the greater trochanteric bursa. Patient is 5 out of 5 motor strength, palpable distal pulses, and intact light touch sensation.     Heart is regular rate without obvious murmurs   Normal respiratory effort without audible wheezing  Abdomen is soft and nontender     X-rays:  X-rays of the right hip are ordered and reviewed which show a minimally displaced valgus impacted femoral neck fracture     Assessment::  Right valgus impacted nondisplaced femoral neck fracture    Plan:  I reviewed the findings with him and his family today.  Recommended closed reduction percutaneous pinning of the right hip.  Risks, benefits, and alternatives to the procedure were explained to the patient including but not limited to damage to nerves, arteries, blood  vessels, bones, tendons, ligaments, stiffness, instability, infection, DVT, PE, as well as general anesthetic complications including seizure, stroke, heart attack and even death. The patient understood these risks and wished to proceed and signed the informed consent.       This note was created using M Modal voice recognition software that occasionally misinterpreted phrases or words.    Consult note is delivered via Epic messaging service.

## 2018-09-26 NOTE — H&P (VIEW-ONLY)
Past Medical History:   Diagnosis Date    Anticoagulant long-term use     ASA 81mg, plavix    Anxiety     Arthritis     Cancer     throat    CHF (congestive heart failure)     Coronary artery disease CABG    6 grafts, stent after surgery    Emphysema of lung     Encounter for blood transfusion     GERD (gastroesophageal reflux disease)     History of thoracentesis 07/2018    Hypertension     Myocardial infarction     Pacemaker     Thyroid disease        Past Surgical History:   Procedure Laterality Date    CARDIAC PACEMAKER PLACEMENT      CATARACT EXTRACTION, BILATERAL  12/2012    COLONOSCOPY      CORONARY ARTERY BYPASS GRAFT  2007?    x6 vessels    JAMARI-TRANSFORAMINAL L4 Bilateral 12/12/2014    [facility]    INJECTION, FACET JOINT Bilateral 9/4/2012    [facility]    INJECTION, STEROID, SPINE, LUMBAR, EPIDURAL N/A 2/4/2013    [facility]    INJECTION-STEROID-EPIDURAL-CERVICAL Right 2/8/2018    [facility]    PROSTATE SURGERY      remove toe nail      SALIVARY GLAND SURGERY      TRIAL-STIMULATOR-DORSAL COLUMN N/A 10/18/2016    [facility]       Current Outpatient Medications   Medication Sig    amLODIPine (NORVASC) 2.5 MG tablet TAKE 1 TABLET BY MOUTH ONCE DAILY FOR HIGH BLOOD PRESSURE    aspirin 81 mg Tab Take 1 tablet by mouth Every morning. Every morning    benazepril (LOTENSIN) 5 MG tablet TAKE 1 TABLET BY MOUTH DAILY FOR HIGH BLOOD PRESSURE    carisoprodol (SOMA) 350 MG tablet TK 1 T PO Q 6 H PRN    carvedilol (COREG) 3.125 MG tablet TAKE 1 TABLET BY MOUTH 2 TIMES DAILY BEFORE MEALS - FOR HEART OR HIGH BLOOD PRESSURE    clopidogrel (PLAVIX) 75 mg tablet TAKE 1 TABLET BY MOUTH DAILY (BLOOD THINNER)    furosemide (LASIX) 20 MG tablet Take 1 tablet (20 mg total) by mouth once daily.    levothyroxine  (SYNTHROID) 100 MCG tablet Take 1 tablet by mouth Every morning. Every morning    lorazepam (ATIVAN) 1 MG tablet TAKE 1 TABLET BY MOUTH EACH EVENING AS NEEDED FOR ANXIETY    nitroGLYCERIN (NITROSTAT) 0.4 MG SL tablet Place 1 tablet (0.4 mg total) under the tongue every 5 (five) minutes as needed for Chest pain. As directed PRN    [START ON 10/5/2018] oxyCODONE-acetaminophen (PERCOCET)  mg per tablet Take 1 tablet by mouth 3 (three) times daily as needed for Pain.    [START ON 11/4/2018] oxyCODONE-acetaminophen (PERCOCET)  mg per tablet Take 1 tablet by mouth 3 (three) times daily as needed for Pain.    [START ON 12/4/2018] oxyCODONE-acetaminophen (PERCOCET)  mg per tablet Take 1 tablet by mouth 3 (three) times daily as needed for Pain.    RANEXA 500 mg Tb12 TAKE 1 TABLET BY MOUTH TWICE DAILY FOR CHEST PAIN PREVENTION    simvastatin (ZOCOR) 40 MG tablet TAKE 1 TABLET BY MOUTH EACH EVENING FOR CHOLESTEROL    temazepam (RESTORIL) 30 mg capsule TAKE 1 CAPSULE BY MOUTH EACH NIGHT AT BEDTIME BEFORE SLEEP    albuterol (PROVENTIL) 2.5 mg /3 mL (0.083 %) nebulizer solution Take 3 mLs (2.5 mg total) by nebulization every 6 (six) hours as needed for Wheezing. Rescue    fluticasone-vilanterol (BREO) 100-25 mcg/dose diskus inhaler Inhale 1 puff into the lungs once daily. Controller     No current facility-administered medications for this visit.        Review of patient's allergies indicates:   Allergen Reactions    No known drug allergies        Family History   Problem Relation Age of Onset    Cancer Sister         pancreatic    Heart disease Sister     Alcohol abuse Brother     No Known Problems Daughter     No Known Problems Sister     No Known Problems Sister     No Known Problems Sister     No Known Problems Sister     Heart disease Brother     No Known Problems Son     No Known Problems Son     No Known Problems Son     No Known Problems Son     No Known Problems Son         Social History     Socioeconomic History    Marital status:      Spouse name: Not on file    Number of children: Not on file    Years of education: Not on file    Highest education level: Not on file   Social Needs    Financial resource strain: Not on file    Food insecurity - worry: Not on file    Food insecurity - inability: Not on file    Transportation needs - medical: Not on file    Transportation needs - non-medical: Not on file   Occupational History    Not on file   Tobacco Use    Smoking status: Current Every Day Smoker     Packs/day: 0.00     Years: 50.00     Pack years: 0.00     Types: Cigarettes     Start date: 12/3/1960    Smokeless tobacco: Never Used    Tobacco comment: 2-3 cigarettes daily   Substance and Sexual Activity    Alcohol use: No    Drug use: Yes     Types: Marijuana     Comment: not prescribed    Sexual activity: Not on file   Other Topics Concern    Not on file   Social History Narrative    Not on file       Chief Complaint:   Chief Complaint   Patient presents with    Hip Pain     RIGHT HIP FRACTURE       History of present illness:  78-year-old male who fell about a week ago and his den.  Landed on his right hip.  Has had pain in his right leg since then.  Was seen at pain management yesterday where x-rays of his right femur showed possible injury to the right hip that could not be ruled out.  Patient has pain of the right hip that he rates as an 8/10.  Difficulty bearing weight.      Review of Systems:    Constitution: Negative for chills, fever, and sweats.  Negative for unexplained weight loss.    HENT:  Negative for headaches and blurry vision.    Cardiovascular:Negative for chest pain or irregular heart beat. Negative for hypertension.    Respiratory:  Negative for cough and shortness of breath.    Gastrointestinal: Negative for abdominal pain, heartburn, melena, nausea, and vomitting.    Genitourinary:  Negative bladder incontinence and  dysuria.    Musculoskeletal:  See HPI    Neurological: Negative for numbness.    Psychiatric/Behavioral: Negative for depression.  The patient is not nervous/anxious.      Endocrine: Negative for polyuria    Hematologic/Lymphatic: Negative for bleeding problem.  Does not bruise/bleed easily.    Skin: Negative for poor would healing and rash      Physical Examination:    Vital Signs:    Vitals:    09/26/18 1418   BP: (!) 118/46   Pulse: 60       Body mass index is 21.77 kg/m².    This a well-developed, well nourished patient in no acute distress.  They are alert and oriented and cooperative to examination.  Pt.  Comes in in a wheelchair    Examination was right leg shows mild pain with hip radiate drive motion. No pain at the knee.  Some pain around the thigh.  Palpable distal pulses.  Intact light touch sensation.  No rashes or erythema.  No significant bruising or obvious deformity.    Examination of the patient's left hip shows full range of motion with flexion to 160°, extension to 0, external rotation to 50°, internal rotation of 15°, abduction of 50°, adduction of 15°. Skin has no rashes or bruising.Patient has no pain with hip range of motion. Nontender to palpation over the greater trochanteric bursa. Patient is 5 out of 5 motor strength, palpable distal pulses, and intact light touch sensation.     Heart is regular rate without obvious murmurs   Normal respiratory effort without audible wheezing  Abdomen is soft and nontender     X-rays:  X-rays of the right hip are ordered and reviewed which show a minimally displaced valgus impacted femoral neck fracture     Assessment::  Right valgus impacted nondisplaced femoral neck fracture    Plan:  I reviewed the findings with him and his family today.  Recommended closed reduction percutaneous pinning of the right hip.  Risks, benefits, and alternatives to the procedure were explained to the patient including but not limited to damage to nerves, arteries, blood  vessels, bones, tendons, ligaments, stiffness, instability, infection, DVT, PE, as well as general anesthetic complications including seizure, stroke, heart attack and even death. The patient understood these risks and wished to proceed and signed the informed consent.       This note was created using M Mod[employer] voice recognition software that occasionally misinterpreted phrases or words.    Consult note is delivered via Epic messaging service.

## 2018-09-27 NOTE — TELEPHONE ENCOUNTER
----- Message from Aby Carter LPN sent at 9/27/2018 10:10 AM CDT -----  Patient is scheduled for a ORIF of the hip due to a fracture tomorrow with Dr. Louise. He is needing a cardiac clearance for tomorrow if possible.   Pt. Taking ASA & Plavix. Please advise.
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
## 2018-09-27 NOTE — OR NURSING
Notified Dr. Sarah that cardiac clearance has not been obtained yet. States that if unable to get clearance, ok to have pt come in to be evaluated as long as he is asymptomatic and no new changes.

## 2018-09-28 NOTE — DISCHARGE SUMMARY
[facility]  Discharge Note  Short Stay    Admit Date: 9/28/2018    Discharge Date and Time: 9/28/2018    Attending Physician: Santos Louise MD     Discharge Provider: Santos Louise    Diagnoses:  Active Hospital Problems    Diagnosis  POA    *Closed fracture of neck of right femur [S72.001A]  Yes      Resolved Hospital Problems   No resolved problems to display.       Discharged Condition: good    Hospital Course: Patient was admitted for an outpatient procedure and tolerated the procedure well with no complications.    Final Diagnoses: Same as principal problem.    Disposition: Home or Self Care    Follow up/Patient Instructions:    Medications:  Reconciled Home Medications:      Medication List      CONTINUE taking these medications    albuterol 2.5 mg /3 mL (0.083 %) nebulizer solution  Commonly known as:  PROVENTIL  Take 3 mLs (2.5 mg total) by nebulization every 6 (six) hours as needed for Wheezing. Rescue     amLODIPine 2.5 MG tablet  Commonly known as:  NORVASC  TAKE 1 TABLET BY MOUTH ONCE DAILY FOR HIGH BLOOD PRESSURE     aspirin 81 mg Tab  Take 1 tablet by mouth Every morning. Every morning     benazepril 5 MG tablet  Commonly known as:  LOTENSIN  TAKE 1 TABLET BY MOUTH DAILY FOR HIGH BLOOD PRESSURE     carisoprodol 350 MG tablet  Commonly known as:  SOMA  TK 1 T PO Q 6 H PRN     carvedilol 3.125 MG tablet  Commonly known as:  COREG  TAKE 1 TABLET BY MOUTH 2 TIMES DAILY BEFORE MEALS - FOR HEART OR HIGH BLOOD PRESSURE     clopidogrel 75 mg tablet  Commonly known as:  PLAVIX  TAKE 1 TABLET BY MOUTH DAILY (BLOOD THINNER)     fluticasone-vilanterol 100-25 mcg/dose diskus inhaler  Commonly known as:  BREO  Inhale 1 puff into the lungs once daily. Controller     furosemide 20 MG tablet  Commonly known as:  LASIX  Take 1 tablet (20 mg total) by mouth once daily.     LORazepam 1 MG tablet  Commonly known as:  ATIVAN  TAKE 1 TABLET BY MOUTH EACH EVENING AS NEEDED FOR ANXIETY    "  nitroGLYCERIN 0.4 MG SL tablet  Commonly known as:  NITROSTAT  Place 1 tablet (0.4 mg total) under the tongue every 5 (five) minutes as needed for Chest pain. As directed PRN     * oxyCODONE-acetaminophen  mg per tablet  Commonly known as:  PERCOCET  Take 1 tablet by mouth 3 (three) times daily as needed for Pain.  Start taking on:  10/5/2018     * oxyCODONE-acetaminophen  mg per tablet  Commonly known as:  PERCOCET  Take 1 tablet by mouth 3 (three) times daily as needed for Pain.  Start taking on:  11/4/2018     * oxyCODONE-acetaminophen  mg per tablet  Commonly known as:  PERCOCET  Take 1 tablet by mouth 3 (three) times daily as needed for Pain.  Start taking on:  12/4/2018     RANEXA 500 MG Tb12  Generic drug:  ranolazine  TAKE 1 TABLET BY MOUTH TWICE DAILY FOR CHEST PAIN PREVENTION     simvastatin 40 MG tablet  Commonly known as:  ZOCOR  TAKE 1 TABLET BY MOUTH EACH EVENING FOR CHOLESTEROL     SYNTHROID 100 MCG tablet  Generic drug:  levothyroxine  Take 1 tablet by mouth Every morning. Every morning     temazepam 30 mg capsule  Commonly known as:  RESTORIL  TAKE 1 CAPSULE BY MOUTH EACH NIGHT AT BEDTIME BEFORE SLEEP         * This list has 3 medication(s) that are the same as other medications prescribed for you. Read the directions carefully, and ask your doctor or other care provider to review them with you.              Discharge Procedure Orders   WALKER FOR HOME USE     Order Specific Question Answer Comments   Type of Walker: Adult (5'4"-6'6")    With wheels? Yes    Height: 5' 7" (1.702 m)    Weight: 63.5 kg (140 lb)    Length of need (1-99 months): 1    Please check all that apply: Patient's condition impairs ambulation.      Remove dressing in 48 hours     Follow-up Information     Santos Louise MD In 2 weeks.    Specialties:  Sports Medicine, Orthopedic Surgery  Contact information:  [address] [place] [address] DR Ev LARA [id number] [phone]                   Discharge Procedure " "Orders (must include Diet, Follow-up, Activity):   Discharge Procedure Orders (must include Diet, Follow-up, Activity)   WALKER FOR HOME USE     Order Specific Question Answer Comments   Type of Walker: Adult (5'4"-6'6")    With wheels? Yes    Height: 5' 7" (1.702 m)    Weight: 63.5 kg (140 lb)    Length of need (1-99 months): 1    Please check all that apply: Patient's condition impairs ambulation.      Remove dressing in 48 hours        "

## 2018-09-28 NOTE — OR NURSING
Pt tolerated procedure well. Pt states no complaints. Neuro checks intact. Discharge instructions given to both patient and friends with verbalized understanding. Pt escorted via w/c at home. Pt states that he has a walker at home.

## 2018-09-28 NOTE — DISCHARGE INSTRUCTIONS
General Information:    1.  Do not drink alcoholic beverages including beer for 24 hours or as long as you are on pain medication..  2.  Do not drive a motor vehicle, operate machinery or power tools, or signs legal papers for 24 hours or as long as you are on pain medication.   3.  You may experience light-headedness, dizziness, and sleepiness following surgery. Please do not stay alone. A responsible adult should be with you for this 24 hour period.  4.  Go home and rest.    5. Progress slowly to a normal diet unless instructed.  Otherwise, begin with liquids such as soft drinks, then soup and crackers working up to solid foods. Drink plenty of nonalcoholic fluids.  6.  Certain anesthetics and pain medications produce nausea and vomiting in certain       individuals. If nausea becomes a problem at home, call you doctor.    7. A nurse will be calling you sometime after surgery. Do not be alarmed. This is our way of finding out how you are doing.    8. Several times every hour while you are awake, take 2-3 deep breaths and cough. If you had stomach surgery hold a pillow or rolled towel firmly against your stomach before you cough. This will help with any pain the cough might cause.  9. Several times every hour while you are awake, pump and flex your feet 5-6 times and do foot circles. This will help prevent blood clots.    10.Call your doctor for severe pain, bleeding, fever, or signs or symptoms of infection (pain, swelling, redness, foul odor, drainage).    Post op instructions for prevention of DVT  What is deep vein thrombosis?  Deep vein thrombosis (DVT) is the medical term for blood clots in the deep veins of the leg.  These blood clots can be dangerous.  A DVT can block a blood vessel and keep blood from getting where it needs to go.  Another problem is that the clot can travel to other parts of the body such as the lungs.  A clot that travels to the lungs is called a pulmonary embolus (PE) and can cause  serious problems with breathing which can lead to death.  Am I at risk for DVT/PE?  If you are not very active, you are at risk of DVT.  Anyone confined to bed, sitting for long periods of time, recovering from surgery, etc. increases the risk of DVT.  Other risk factors are cancer diagnosis, certain medications, estrogen replacement in any form,older age, obesity, pregnancy, smoking, history of clotting disorders, and dehydration.  How will I know if I have a DVT?   Swelling in the lower leg   Pain   Warmth, redness, hardness or bulging of the vein  If you have any of these symptoms, call your doctors office right away.  Some people will not have any symptoms until the clot moves to the lungs.  What are the symptoms of a PE?   Panting, shortness of breath, or trouble breathing   Sharp, knife-like chest pain when you breathe   Coughing or coughing up blood   Rapid heartbeat  If you have any of these symptoms or get worse quickly, call 911 for emergency treatment.  How can I prevent a DVT?   Avoid long periods of inactivity and dont cross your legs--get up and walk around every hour or so.   Stay active--walking after surgery is highly encouraged.  This means you should get out of the house and walk in the neighborhood.  Going up and down stairs will not impair healing (unless advised against such activity by your doctor).     Drink plenty of noncaffeinated, nonalcoholic fluids each day to prevent dehydration.   Wear special support stockings, if they have been advised by your doctor.   If you travel, stop at least once an hour and walk around.   Avoid smoking (assistance with stopping is available through your healthcare provider)  Always notify your doctor if you are not able to follow the post operative instructions that are given to you at the time of discharge.  It may be necessary to prescribe one of the medications available to prevent DVT.    Discharge Instructions: After Your  "Surgery/Procedure  Youve just had surgery. During surgery you were given medicine called anesthesia to keep you relaxed and free of pain. After surgery you may have some pain or nausea. This is common. Here are some tips for feeling better and getting well after surgery.     Stay on schedule with your medication.   Going home  Your doctor or nurse will show you how to take care of yourself when you go home. He or she will also answer your questions. Have an adult family member or friend drive you home.      For your safety we recommend these precaution for the first 24 hours after your procedure:  · Do not drive or use heavy equipment.  · Do not make important decisions or sign legal papers.  · Do not drink alcohol.  · Have someone stay with you, if needed. He or she can watch for problems and help keep you safe.  · Your concentration, balance, coordination, and judgement may be impaired for many hours after anesthesia.  Use caution when ambulating or standing up.     · You may feel weak and "washed out" after anesthesia and surgery.      Subtle residual effects of general anesthesia or sedation with regional / local anesthesia can last more than 24 hours.  Rest for the remainder of the day or longer if your Doctor/Surgeon has advised you to do so.  Although you may feel normal within the first 24 hours, your reflexes and mental ability may be impaired without you realizing it.  You may feel dizzy, lightheaded or sleepy for 24 hours or longer.      Be sure to go to all follow-up visits with your doctor. And rest after your surgery for as long as your doctor tells you to.  Coping with pain  If you have pain after surgery, pain medicine will help you feel better. Take it as told, before pain becomes severe. Also, ask your doctor or pharmacist about other ways to control pain. This might be with heat, ice, or relaxation. And follow any other instructions your surgeon or nurse gives you.  Tips for taking pain " medicine  To get the best relief possible, remember these points:  · Pain medicines can upset your stomach. Taking them with a little food may help.  · Most pain relievers taken by mouth need at least 20 to 30 minutes to start to work.  · Taking medicine on a schedule can help you remember to take it. Try to time your medicine so that you can take it before starting an activity. This might be before you get dressed, go for a walk, or sit down for dinner.  · Constipation is a common side effect of pain medicines. Call your doctor before taking any medicines such as laxatives or stool softeners to help ease constipation. Also ask if you should skip any foods. Drinking lots of fluids and eating foods such as fruits and vegetables that are high in fiber can also help. Remember, do not take laxatives unless your surgeon has prescribed them.  · Drinking alcohol and taking pain medicine can cause dizziness and slow your breathing. It can even be deadly. Do not drink alcohol while taking pain medicine.  · Pain medicine can make you react more slowly to things. Do not drive or run machinery while taking pain medicine.  Your health care provider may tell you to take acetaminophen to help ease your pain. Ask him or her how much you are supposed to take each day. Acetaminophen or other pain relievers may interact with your prescription medicines or other over-the-counter (OTC) drugs. Some prescription medicines have acetaminophen and other ingredients. Using both prescription and OTC acetaminophen for pain can cause you to overdose. Read the labels on your OTC medicines with care. This will help you to clearly know the list of ingredients, how much to take, and any warnings. It may also help you not take too much acetaminophen. If you have questions or do not understand the information, ask your pharmacist or health care provider to explain it to you before you take the OTC medicine.  Managing nausea  Some people have an upset  stomach after surgery. This is often because of anesthesia, pain, or pain medicine, or the stress of surgery. These tips will help you handle nausea and eat healthy foods as you get better. If you were on a special food plan before surgery, ask your doctor if you should follow it while you get better. These tips may help:  · Do not push yourself to eat. Your body will tell you when to eat and how much.  · Start off with clear liquids and soup. They are easier to digest.  · Next try semi-solid foods, such as mashed potatoes, applesauce, and gelatin, as you feel ready.  · Slowly move to solid foods. Dont eat fatty, rich, or spicy foods at first.  · Do not force yourself to have 3 large meals a day. Instead eat smaller amounts more often.  · Take pain medicines with a small amount of solid food, such as crackers or toast, to avoid nausea.     Call your surgeon if  · You still have pain an hour after taking medicine. The medicine may not be strong enough.  · You feel too sleepy, dizzy, or groggy. The medicine may be too strong.  · You have side effects like nausea, vomiting, or skin changes, such as rash, itching, or hives.       If you have obstructive sleep apnea  You were given anesthesia medicine during surgery to keep you comfortable and free of pain. After surgery, you may have more apnea spells because of this medicine and other medicines you were given. The spells may last longer than usual.   At home:  · Keep using the continuous positive airway pressure (CPAP) device when you sleep. Unless your health care provider tells you not to, use it when you sleep, day or night. CPAP is a common device used to treat obstructive sleep apnea.  · Talk with your provider before taking any pain medicine, muscle relaxants, or sedatives. Your provider will tell you about the possible dangers of taking these medicines.  © 2405-6195 [employer]. [address] [place] [address], [place] [address]. All rights reserved. This  information is not intended as a substitute for professional medical care. Always follow your healthcare professional's instructions.      Discharge Instructions for Internal Fixation of a Fractured Femur  You had a procedure called internal fixation of a fractured femur (thighbone). During the procedure, a ankit, plates and screws, or several pins were inserted inside the bone to hold the broken ends of bone in place while they heal. Once the bone has healed, the ankit, plate and screws, or pins may need to be surgically removed. A broken femur is a serious injury that takes about 3 to 6 months to heal. Here are instructions to help you care for your leg when you are at home.  Activity  · Arrange your household to keep the items you need within reach.  · Remove electrical cords, throw rugs, and anything else that may cause you to fall.  · Use nonslip bath mats, grab bars, an elevated toilet seat, and a shower chair in your bathroom.  · Follow the weight-bearing instructions given by your healthcare provider. He or she will tell you how much weight you are--or are not--allowed to put on your leg.  · Use a cane, crutches, a walker, or handrails until your balance, flexibility, and strength improve. And remember to ask for help from others when you need it.  · Free up your hands so that you can use them to keep balance. Use a cece pack, apron, or pockets to carry things.  · Dont sit or lie in the same position for long periods, or with your legs crossed. Carefully reposition yourself every 30 to 60 minutes.  · Dont drive until your healthcare provider says its OK. And never drive while taking opioid pain medicine.  Home care  · Take your pain medicine exactly as directed.  · Take special care when showering. Follow your healthcare providers instructions closely. Do the following, as instructed:  ¨ If you wear a leg brace or immobilizer, cover it with plastic to keep it dry while you shower.  ¨ If you do not wear a  leg brace or immobilizer, carefully wash your incision with soap and water. Gently pat it dry. Dont rub the incision, or apply creams or lotions to it. To avoid falling while showering, sit on a shower stool.   · Tell all of your healthcare providers--including your dentist--that you have a ankit, plate and screws, or pin in your leg. You may need to take antibiotics before dental work and other medical procedures to reduce the risk of infection.  Follow-up care  Follow up with your healthcare provider, or as advised.      When to call your healthcare provider  Call 911 right away if you have any of the following:  · Chest pain  · Shortness of breath  Otherwise, call your healthcare provider right away if you have any of these:  · Numbness or tingling in your leg or toes  · Cool, pale, red- or blue-colored leg or toes  · Fever above 100.4°F (38.0°C), or as advised  · Shaking chills  · Increased pain  · Swelling of the fracture site or calf  · Drainage with foul odor coming from the dressing  · A rash   Date Last Reviewed: 7/1/2016  © 0187-2301 [employer]. [address] [place] [address], [place] [address]. All rights reserved. This information is not intended as a substitute for professional medical care. Always follow your healthcare professional's instructions.

## 2018-09-28 NOTE — OR NURSING
Spoke with Dr Coombs re:pain med Rx, he said that the patient needs to contact pain management to get refill of his current Rx. Family informed and in agreement   yes

## 2018-09-28 NOTE — OR NURSING
Pt arrived to preop with the assistance of a w/c. Pt helped to bed. SCD to left leg placed and warm blankets provided. Periop process explained to both patient and friends with verbalized understanding. Pts belongings tagged/bagged and given to friend.

## 2018-09-28 NOTE — TRANSFER OF CARE
"Anesthesia Transfer of Care Note    Patient: Dung Lewsi    Procedure(s) Performed: Procedure(s) (LRB):  PINNING, HIP (Right)    Patient location: PACU    Anesthesia Type: general    Transport from OR: Transported from OR on 2-3 L/min O2 by NC with adequate spontaneous ventilation    Post pain: adequate analgesia    Post assessment: no apparent anesthetic complications and tolerated procedure well    Post vital signs: stable    Level of consciousness: sedated and responds to stimulation    Nausea/Vomiting: no nausea/vomiting    Complications: none    Transfer of care protocol was followed      Last vitals:   Visit Vitals  BP (!) 154/65 (BP Location: Left arm, Patient Position: Lying)   Pulse 60   Temp 36.5 °C (97.7 °F) (Temporal)   Resp 16   Ht 5' 7" (1.702 m)   Wt 63.5 kg (140 lb)   SpO2 98%   BMI 21.93 kg/m²     "

## 2018-09-28 NOTE — OP NOTE
[facility]  Orthopedic Surgery  Operative Note    SUMMARY     Date of Procedure: 9/28/2018     Procedure: Procedure(s) (LRB):  PINNING, HIP (Right)       Surgeon(s) and Role:     * Santos Louise MD - Primary    Assisting Surgeon: None    Pre-Operative Diagnosis: Closed fracture of neck of right femur, initial encounter [S72.001A]    Post-Operative Diagnosis: Post-Op Diagnosis Codes:     * Closed fracture of neck of right femur, initial encounter [S72.001A]    Anesthesia: General      Description of the Findings of the Procedure: Valgus impacted femoral neck fracture        Complications: No    Estimated Blood Loss (EBL): 20Ml           Implants:   Implant Name Type Inv. Item Serial No.  Lot No. LRB No. Used   GUIDEWIRE CALIBRATED [id number] - [id number]  GUIDEWIRE CALIBRATED [id number]  [employer]. [id number] Right 1   GUIDEWIRE CALIBRATED [id number] - [id number]  GUIDEWIRE CALIBRATED [id number]  [employer]. [id number] Right 1   GUIDEWIRE CALIBRATED [id number] - [id number]  GUIDEWIRE CALIBRATED [id number]  [employer]. [id number] Right 1   SCREW BONE CANNULATED 6.5X85MM - [id number]  SCREW BONE CANNULATED 6.5X85MM  [employer]. [id number] Right 1   SCREW BONE CANNULATED 6.5X85MM - [id number]  SCREW BONE CANNULATED 6.5X85MM  [employer]. [id number] Right 1   SCREW BONE ASNIS III 6.5X95MM - [id number]  SCREW BONE ASNIS III 6.5X95MM  [employer]. [id number] Right 1   SCREW BONE ASNIS III 6.5X90MM - [id number]  SCREW BONE ASNIS III 6.5X90MM  [employer]. [id number] Right 1       Specimens:   Specimen (12h ago, onward)    None                  Condition: Good    Disposition: PACU - hemodynamically stable.    Attestation: I was present and scrubbed for the entire procedure.     INDICATIONS FOR THE PROCEDURE: A 78 year-old patient sustained a fall. The patient sustained a hip fracture. The patient was medically cleared and after discussion with the family proceeded  with the procedure above.     PROCEDURE IN DETAIL: Risks, benefits and alternatives of the procedure were   explained to the patient and family including, but not limited to damage to   nerves, arteries, blood vessels. Also explained the risk of nonunion, malunion,  hardware prominence, hardware failure, cut out as well as infection, DVT, PE as  well as anesthetic complications including seizure, stroke, heart attack and   death. They understood this and signed informed consent. The patient's left   hip was marked prior to coming to the Operating Room. Once a formal timeout was  done in which correct patient, procedure and op site were all correctly   identified and confirmed by the entire operating team. Then,2gm Ancef were given prior to surgical incision. General anesthesia was induced. The patient's right lower extremity was prepped and draped in normal   sterile fashion. Fluoro was brought in to make sure that we could adequately   reduce the fracture closed which we could with traction on the fracture table.   A 3 cm incision was made over the lateral thigh and through the IT band. A guidewire was inserted right along the inferior neck and centered in the neck to the tip of the head. This was checked on AP and lateral. This was then drilled and an 85 6.5mm partially threaded screw was advanced with good bite. A second parallel screw was inserted starting proximally and hugging the posterior cortex. This was confirmed and then screw was drilled and inserted. The second screw measured 85.  A third screw was placed aiming superiorly and anteriorly to space out the screws and another 90 was placed. These were all short of the subchondral bone and confirmed not to enter the joint. Wires were removed.     After this was   done, we proceeded with closing. Wounds were irrigated with normal   saline. Deep tissue was closed using 0 Vicryl, subcutaneous tissue was closed   using 2-0 Vicryl and then skin staples. Sterile  dressing was applied. They were  extubated, awakened, and was transferred from the Operating Room to the Recovery  Room in stable condition.

## 2018-09-28 NOTE — ANESTHESIA PREPROCEDURE EVALUATION
09/28/2018  Dung Lewis is a 78 y.o., male.    Anesthesia Evaluation    I have reviewed the Patient Summary Reports.    I have reviewed the Nursing Notes.   I have reviewed the Medications.     Review of Systems  Hematology/Oncology:         -- Cancer in past history: Other (see Oncology comments) surgery    Cardiovascular:   Pacemaker Hypertension Past MI CAD  CABG/stent  CHF    Pulmonary:   COPD, moderate    Hepatic/GI:   GERD    Musculoskeletal:   Arthritis     Neurological:   Neuromuscular Disease,    Endocrine:   Hypothyroidism    Psych:   Psychiatric History anxiety          Physical Exam  General:  Well nourished    Airway/Jaw/Neck:  Airway Findings: Mouth Opening: Normal Tongue: Normal  General Airway Assessment: Adult  Mallampati: I  TM Distance: Normal, at least 6 cm     Eyes/Ears/Nose:  Eyes/Ears/Nose Findings:    Dental:  Dental Findings:Essentially edentulous. Few teeth.  None loose   Chest/Lungs:  Chest/Lungs Findings: Normal Respiratory Rate, Decreased Breathe Sounds Left     Heart/Vascular:  Heart Findings: Rate: Normal  Rhythm: Regular Rhythm  Heart Murmur  Systolic  Systolic Heart Murmur Description: R Upper Sternal Border  Systolic Heart Murmur Grade: Grade III        Mental Status:  Mental Status Findings:  Cooperative, Alert and Oriented         Anesthesia Plan  Type of Anesthesia, risks & benefits discussed:  Anesthesia Type:  general  Patient's Preference:   Intra-op Monitoring Plan: standard ASA monitors  Intra-op Monitoring Plan Comments:   Post Op Pain Control Plan: multimodal analgesia and IV/PO Opioids PRN  Post Op Pain Control Plan Comments:   Induction:   IV  Beta Blocker:  Patient is on a Beta-Blocker and has received one dose within the past 24 hours (No further documentation required).       Informed Consent: Patient understands risks and agrees with Anesthesia plan.   Questions answered. Anesthesia consent signed with patient.  ASA Score: 4     Day of Surgery Review of History & Physical:    H&P update referred to the surgeon.         Ready For Surgery From Anesthesia Perspective.

## 2018-09-29 NOTE — ANESTHESIA POSTPROCEDURE EVALUATION
"Anesthesia Post Evaluation    Patient: Dung Lewis    Procedure(s) Performed: Procedure(s) (LRB):  PINNING, HIP (Right)    Final Anesthesia Type: general  Patient location during evaluation: PACU  Patient participation: Yes- Able to Participate  Level of consciousness: awake and alert  Post-procedure vital signs: reviewed and stable  Pain management: adequate  Airway patency: patent  PONV status at discharge: No PONV  Anesthetic complications: no      Cardiovascular status: hemodynamically stable  Respiratory status: unassisted and room air  Hydration status: euvolemic  Follow-up not needed.        Visit Vitals  BP (!) 154/66   Pulse 88   Temp 37.1 °C (98.8 °F) (Temporal)   Resp 16   Ht 5' 7" (1.702 m)   Wt 63.5 kg (140 lb)   SpO2 (!) 93%   BMI 21.93 kg/m²       Pain/Trevon Score: Pain Assessment Performed: Yes (9/28/2018  3:38 PM)  Presence of Pain: complains of pain/discomfort (9/28/2018  2:40 PM)  Pain Rating Prior to Med Admin: 6 (9/28/2018  3:38 PM)  Pain Rating Post Med Admin: 7 (9/28/2018  3:25 PM)  Trevon Score: 10 (9/28/2018  3:38 PM)        "

## 2018-10-02 NOTE — TELEPHONE ENCOUNTER
----- Message from Saúl Sylvester sent at 10/2/2018  1:07 PM CDT -----  Contact: Vilma Wadsworth, patient's friend            Name of Who is Calling: Vilma Wadsworth, patient's friend      What is the request in detail: Patient needs to reschedule appt and pacemaker check      Can the clinic reply by [facility]: No      [facility]: [phone]

## 2018-10-08 NOTE — TELEPHONE ENCOUNTER
----- Message from Saúl Sylvester sent at 10/8/2018  1:27 PM CDT -----  Contact: Dung Lewis            Name of Who is Calling: Dung Lewis      What is the request in detail: Patient called to schedule an appt and needs to schedule pacemaker as well.      Can the clinic reply by [facility]: No      What Number to Call Back if not in [facility]: [phone]

## 2018-10-10 NOTE — PROGRESS NOTES
September 28, 2018  Past Medical History:   Diagnosis Date    Anticoagulant long-term use     ASA 81mg, plavix    Anxiety     Arthritis     Cancer     throat    CHF (congestive heart failure)     Coronary artery disease CABG    6 grafts, stent after surgery    Emphysema of lung     Encounter for blood transfusion     GERD (gastroesophageal reflux disease)     History of thoracentesis 07/2018    Hypertension     Myocardial infarction     Pacemaker     Thyroid disease     Wears dentures     uppers; bridges on bottom       Past Surgical History:   Procedure Laterality Date    CARDIAC CATHETERIZATION      stent    CARDIAC PACEMAKER PLACEMENT      CARDIAC SURGERY      6 vessel bypass    CATARACT EXTRACTION, BILATERAL  12/2012    COLONOSCOPY      CORONARY ARTERY BYPASS GRAFT  2007?    x6 vessels    JAMARI-TRANSFORAMINAL L4 Bilateral 12/12/2014    [facility] at [facility]    EYE SURGERY Bilateral     cataract    INJECTION, FACET JOINT Bilateral 9/4/2012    [facility]    INJECTION, STEROID, SPINE, LUMBAR, EPIDURAL N/A 2/4/2013    [facility]    INJECTION-STEROID-EPIDURAL-CERVICAL Right 2/8/2018    [facility]    PINNING OF HIP Right 9/28/2018    Procedure: PINNING, HIP;  Surgeon: Santos Louise MD;  Location: [facility] OR;  Service: Orthopedics;  Laterality: Right;    PINNING, HIP Right 9/28/2018    [facility]    PROSTATE SURGERY      remove toe nail      SALIVARY GLAND SURGERY      TRIAL-STIMULATOR-DORSAL COLUMN N/A 10/18/2016    Performed by [facility] at [facility] OR       Current Outpatient Medications   Medication Sig    albuterol (PROVENTIL) 2.5 mg /3 mL (0.083 %) nebulizer solution Take 3 mLs (2.5 mg total) by nebulization every 6 (six) hours as needed for Wheezing. Rescue    amLODIPine (NORVASC) 2.5 MG tablet  TAKE 1 TABLET BY MOUTH ONCE DAILY FOR HIGH BLOOD PRESSURE    aspirin 81 mg Tab Take 1 tablet by mouth Every morning. Every morning    benazepril (LOTENSIN) 5 MG tablet TAKE 1 TABLET BY MOUTH DAILY FOR HIGH BLOOD PRESSURE    carisoprodol (SOMA) 350 MG tablet TK 1 T PO Q 6 H PRN    carvedilol (COREG) 3.125 MG tablet TAKE 1 TABLET BY MOUTH 2 TIMES DAILY BEFORE MEALS - FOR HEART OR HIGH BLOOD PRESSURE    clopidogrel (PLAVIX) 75 mg tablet TAKE 1 TABLET BY MOUTH DAILY (BLOOD THINNER)    fluticasone-vilanterol (BREO) 100-25 mcg/dose diskus inhaler Inhale 1 puff into the lungs once daily. Controller    furosemide (LASIX) 20 MG tablet Take 1 tablet (20 mg total) by mouth once daily.    levothyroxine (SYNTHROID) 100 MCG tablet Take 1 tablet by mouth Every morning. Every morning    lorazepam (ATIVAN) 1 MG tablet TAKE 1 TABLET BY MOUTH EACH EVENING AS NEEDED FOR ANXIETY    nitroGLYCERIN (NITROSTAT) 0.4 MG SL tablet Place 1 tablet (0.4 mg total) under the tongue every 5 (five) minutes as needed for Chest pain. As directed PRN    oxyCODONE-acetaminophen (PERCOCET)  mg per tablet Take 1 tablet by mouth 3 (three) times daily as needed for Pain.    [START ON 11/4/2018] oxyCODONE-acetaminophen (PERCOCET)  mg per tablet Take 1 tablet by mouth 3 (three) times daily as needed for Pain.    [START ON 12/4/2018] oxyCODONE-acetaminophen (PERCOCET)  mg per tablet Take 1 tablet by mouth 3 (three) times daily as needed for Pain.    RANEXA 500 mg Tb12 TAKE 1 TABLET BY MOUTH TWICE DAILY FOR CHEST PAIN PREVENTION    simvastatin (ZOCOR) 40 MG tablet TAKE 1 TABLET BY MOUTH EACH EVENING FOR CHOLESTEROL    temazepam (RESTORIL) 30 mg capsule TAKE 1 CAPSULE BY MOUTH EACH NIGHT AT BEDTIME BEFORE SLEEP     No current facility-administered medications for this visit.        Review of patient's allergies indicates:   Allergen Reactions    No known drug allergies        Family History   Problem Relation Age of Onset     Cancer Sister         pancreatic    Heart disease Sister     Alcohol abuse Brother     No Known Problems Daughter     No Known Problems Sister     No Known Problems Sister     No Known Problems Sister     No Known Problems Sister     Heart disease Brother     No Known Problems Son     No Known Problems Son     No Known Problems Son     No Known Problems Son     No Known Problems Son        Social History     Socioeconomic History    Marital status:      Spouse name: Not on file    Number of children: Not on file    Years of education: Not on file    Highest education level: Not on file   Social Needs    Financial resource strain: Not on file    Food insecurity - worry: Not on file    Food insecurity - inability: Not on file    Transportation needs - medical: Not on file    Transportation needs - non-medical: Not on file   Occupational History    Not on file   Tobacco Use    Smoking status: Current Every Day Smoker     Packs/day: 0.50     Years: 50.00     Pack years: 25.00     Types: Cigarettes     Start date: 12/3/1960    Smokeless tobacco: Never Used    Tobacco comment: 2-3 cigarettes daily   Substance and Sexual Activity    Alcohol use: No    Drug use: Yes     Types: Marijuana     Comment: not prescribed    Sexual activity: Not on file   Other Topics Concern    Not on file   Social History Narrative    Not on file       Chief Complaint:   Chief Complaint   Patient presents with    Hip Pain     R hip s/p pinning 9/28/18       Date of surgery:  September 28, 2018    History of present illness:  78-year-old male who underwent percutaneous pinning of her right valgus impacted femoral neck fracture. Patient rates his pain as a 10/10.  He has been doing a lot a walking however.  No wound issues.      Review of Systems:    Musculoskeletal:  See HPI        Physical Examination:    Vital Signs:    Vitals:    10/10/18 1326   BP: (!) 129/42   Pulse: (!) 59       Body mass index is  21.93 kg/m².    This a well-developed, well nourished patient in no acute distress.  They are alert and oriented and cooperative to examination.  Comes in a wheelchair.    Examination of the right hip shows well-healing surgical incision.  There is some erythema just around the staple sites.  Small hematoma underneath wound.    X-rays:  X-rays of the right hip are ordered and reviewed which show hip screws in good position. No loss of reduction.     Assessment::  Status post percutaneous screws of right femoral neck fracture    Plan:  Reviewed the findings with him today.  Continue to restrict weight-bearing as much as possible.  Take out his staples today.  Follow up in 4 weeks with another x-ray of the right hip.    This note was created using M Modal voice recognition software that occasionally misinterpreted phrases or words.

## 2018-10-29 PROBLEM — J81.0 PULMONARY EDEMA, ACUTE: Status: RESOLVED | Noted: 2018-01-01 | Resolved: 2018-01-01

## 2018-11-14 NOTE — PROGRESS NOTES
September 28, 2018  Past Medical History:   Diagnosis Date    Anticoagulant long-term use     ASA 81mg, plavix    Anxiety     Arthritis     Cancer     throat    CHF (congestive heart failure)     Coronary artery disease CABG    6 grafts, stent after surgery    Emphysema of lung     Encounter for blood transfusion     GERD (gastroesophageal reflux disease)     History of thoracentesis 07/2018    Hypertension     Myocardial infarction     Pacemaker     Thyroid disease     Wears dentures     uppers; bridges on bottom       Past Surgical History:   Procedure Laterality Date    CARDIAC CATHETERIZATION      stent    CARDIAC PACEMAKER PLACEMENT      CARDIAC SURGERY      6 vessel bypass    CATARACT EXTRACTION, BILATERAL  12/2012    COLONOSCOPY      CORONARY ARTERY BYPASS GRAFT  2007?    x6 vessels    JAMARI-TRANSFORAMINAL L4 Bilateral 12/12/2014    [facility]    EYE SURGERY Bilateral     cataract    INJECTION, FACET JOINT Bilateral 9/4/2012    [facility]    INJECTION, STEROID, SPINE, LUMBAR, EPIDURAL N/A 2/4/2013    [facility]    INJECTION-STEROID-EPIDURAL-CERVICAL Right 2/8/2018    [facility]    PINNING OF HIP Right 9/28/2018    Procedure: PINNING, HIP;  Surgeon: Santos Louise MD;  Location: [facility] OR;  Service: Orthopedics;  Laterality: Right;    PINNING, HIP Right 9/28/2018    [facility]    PROSTATE SURGERY      remove toe nail      SALIVARY GLAND SURGERY      TRIAL-STIMULATOR-DORSAL COLUMN N/A 10/18/2016    [facility]       Current Outpatient Medications   Medication Sig    amLODIPine (NORVASC) 2.5 MG tablet TAKE 1 TABLET BY MOUTH ONCE DAILY FOR HIGH BLOOD PRESSURE    aspirin 81 mg Tab Take 1 tablet by mouth Every morning. Every morning    benazepril (LOTENSIN) 5 MG  tablet TAKE 1 TABLET BY MOUTH DAILY FOR HIGH BLOOD PRESSURE    carisoprodol (SOMA) 350 MG tablet TK 1 T PO Q 6 H PRN    carvedilol (COREG) 3.125 MG tablet TAKE 1 TABLET BY MOUTH 2 TIMES DAILY BEFORE MEALS - FOR HEART OR HIGH BLOOD PRESSURE    clopidogrel (PLAVIX) 75 mg tablet TAKE 1 TABLET BY MOUTH DAILY (BLOOD THINNER)    furosemide (LASIX) 20 MG tablet Take 1 tablet (20 mg total) by mouth once daily.    levothyroxine (SYNTHROID) 100 MCG tablet Take 1 tablet by mouth Every morning. Every morning    lorazepam (ATIVAN) 1 MG tablet TAKE 1 TABLET BY MOUTH EACH EVENING AS NEEDED FOR ANXIETY    nitroGLYCERIN (NITROSTAT) 0.4 MG SL tablet Place 1 tablet (0.4 mg total) under the tongue every 5 (five) minutes as needed for Chest pain. As directed PRN    oxyCODONE-acetaminophen (PERCOCET)  mg per tablet Take 1 tablet by mouth 3 (three) times daily as needed for Pain.    [START ON 12/4/2018] oxyCODONE-acetaminophen (PERCOCET)  mg per tablet Take 1 tablet by mouth 3 (three) times daily as needed for Pain.    RANEXA 500 mg Tb12 TAKE 1 TABLET BY MOUTH TWICE DAILY FOR CHEST PAIN PREVENTION    simvastatin (ZOCOR) 40 MG tablet TAKE 1 TABLET BY MOUTH EACH EVENING FOR CHOLESTEROL    temazepam (RESTORIL) 30 mg capsule TAKE 1 CAPSULE BY MOUTH EACH NIGHT AT BEDTIME BEFORE SLEEP    albuterol (PROVENTIL) 2.5 mg /3 mL (0.083 %) nebulizer solution Take 3 mLs (2.5 mg total) by nebulization every 6 (six) hours as needed for Wheezing. Rescue    fluticasone-vilanterol (BREO) 100-25 mcg/dose diskus inhaler Inhale 1 puff into the lungs once daily. Controller     No current facility-administered medications for this visit.        Review of patient's allergies indicates:   Allergen Reactions    No known drug allergies        Family History   Problem Relation Age of Onset    Cancer Sister         pancreatic    Heart disease Sister     Alcohol abuse Brother     No Known Problems Daughter     No Known Problems Sister      No Known Problems Sister     No Known Problems Sister     No Known Problems Sister     Heart disease Brother     No Known Problems Son     No Known Problems Son     No Known Problems Son     No Known Problems Son     No Known Problems Son        Social History     Socioeconomic History    Marital status:      Spouse name: Not on file    Number of children: Not on file    Years of education: Not on file    Highest education level: Not on file   Social Needs    Financial resource strain: Not on file    Food insecurity - worry: Not on file    Food insecurity - inability: Not on file    Transportation needs - medical: Not on file    Transportation needs - non-medical: Not on file   Occupational History    Not on file   Tobacco Use    Smoking status: Current Every Day Smoker     Packs/day: 0.50     Years: 50.00     Pack years: 25.00     Types: Cigarettes     Start date: 12/3/1960    Smokeless tobacco: Never Used    Tobacco comment: 2-3 cigarettes daily   Substance and Sexual Activity    Alcohol use: No    Drug use: Yes     Types: Marijuana     Comment: not prescribed    Sexual activity: Not on file   Other Topics Concern    Not on file   Social History Narrative    Not on file       Chief Complaint:   Chief Complaint   Patient presents with    Hip Pain     R hip 4wk f/u       Date of surgery:  September 28, 2018    History of present illness:  78-year-old male who underwent percutaneous pinning of her right valgus impacted femoral neck fracture. Patient rates his pain as a 7/10.  He has been doing a lot a walking however.  No wound issues.  Walking a little more.      Review of Systems:    Musculoskeletal:  See HPI        Physical Examination:    Vital Signs:    Vitals:    11/14/18 1348   BP: (!) 126/44   Pulse: 60       Body mass index is 21.93 kg/m².    This a well-developed, well nourished patient in no acute distress.  They are alert and oriented and cooperative to examination.   Comes in a wheelchair.    Examination of the right hip shows well-healing surgical incision.  There is some   Small hematoma underneath wound.    X-rays:  X-rays of the right hip are ordered and reviewed which show hip screws in good position. No loss of reduction.     Assessment::  Status post percutaneous screws of right femoral neck fracture    Plan:  Reviewed the findings with him today.  Okay for weight-bearing as tolerated.  Recommended some heat and some scar massage in lotion for the hematoma.  Follow up in 6 weeks with x-rays of the right hip.    This note was created using M Modal voice recognition software that occasionally misinterpreted phrases or words.

## 2018-12-01 PROBLEM — J90 RECURRENT PLEURAL EFFUSION ON LEFT: Status: ACTIVE | Noted: 2018-01-01

## 2018-12-03 PROBLEM — J90 RECURRENT PLEURAL EFFUSION ON LEFT: Status: RESOLVED | Noted: 2018-01-01 | Resolved: 2018-01-01

## 2018-12-11 PROBLEM — J90 PLEURAL EFFUSION: Status: ACTIVE | Noted: 2018-01-01

## 2019-01-01 ENCOUNTER — TELEPHONE (OUTPATIENT)
Dept: PAIN MEDICINE | Facility: CLINIC | Age: 79
End: 2019-01-01

## 2019-01-01 ENCOUNTER — OFFICE VISIT (OUTPATIENT)
Dept: PAIN MEDICINE | Facility: CLINIC | Age: 79
End: 2019-01-01
Payer: MEDICARE

## 2019-01-01 ENCOUNTER — OFFICE VISIT (OUTPATIENT)
Dept: ORTHOPEDICS | Facility: CLINIC | Age: 79
End: 2019-01-01
Payer: MEDICARE

## 2019-01-01 VITALS
BODY MASS INDEX: 20.09 KG/M2 | DIASTOLIC BLOOD PRESSURE: 68 MMHG | WEIGHT: 128 LBS | HEIGHT: 67 IN | SYSTOLIC BLOOD PRESSURE: 117 MMHG | HEART RATE: 58 BPM

## 2019-01-01 VITALS
SYSTOLIC BLOOD PRESSURE: 129 MMHG | HEART RATE: 64 BPM | RESPIRATION RATE: 18 BRPM | TEMPERATURE: 98 F | DIASTOLIC BLOOD PRESSURE: 52 MMHG

## 2019-01-01 DIAGNOSIS — S72.001D CLOSED FRACTURE OF NECK OF RIGHT FEMUR WITH ROUTINE HEALING, SUBSEQUENT ENCOUNTER: Primary | ICD-10-CM

## 2019-01-01 DIAGNOSIS — M25.551 RIGHT HIP PAIN: Primary | ICD-10-CM

## 2019-01-01 DIAGNOSIS — M48.061 SPINAL STENOSIS OF LUMBAR REGION WITHOUT NEUROGENIC CLAUDICATION: ICD-10-CM

## 2019-01-01 DIAGNOSIS — Z79.891 OPIOID CONTRACT EXISTS: ICD-10-CM

## 2019-01-01 DIAGNOSIS — M43.16 SPONDYLOLISTHESIS OF LUMBAR REGION: ICD-10-CM

## 2019-01-01 DIAGNOSIS — M47.816 LUMBAR SPONDYLOSIS: Primary | ICD-10-CM

## 2019-01-01 DIAGNOSIS — M25.551 RIGHT HIP PAIN: ICD-10-CM

## 2019-01-01 PROCEDURE — 99213 PR OFFICE/OUTPT VISIT, EST, LEVL III, 20-29 MIN: ICD-10-PCS | Mod: S$PBB,,, | Performed by: PHYSICIAN ASSISTANT

## 2019-01-01 PROCEDURE — 99213 OFFICE O/P EST LOW 20 MIN: CPT | Mod: PBBFAC,PN | Performed by: ORTHOPAEDIC SURGERY

## 2019-01-01 PROCEDURE — 99999 PR PBB SHADOW E&M-EST. PATIENT-LVL IV: CPT | Mod: PBBFAC,,, | Performed by: PHYSICIAN ASSISTANT

## 2019-01-01 PROCEDURE — 99213 PR OFFICE/OUTPT VISIT, EST, LEVL III, 20-29 MIN: ICD-10-PCS | Mod: S$PBB,,, | Performed by: ORTHOPAEDIC SURGERY

## 2019-01-01 PROCEDURE — 99214 OFFICE O/P EST MOD 30 MIN: CPT | Mod: PBBFAC,PN | Performed by: PHYSICIAN ASSISTANT

## 2019-01-01 PROCEDURE — 99999 PR PBB SHADOW E&M-EST. PATIENT-LVL IV: ICD-10-PCS | Mod: PBBFAC,,, | Performed by: PHYSICIAN ASSISTANT

## 2019-01-01 PROCEDURE — 99213 OFFICE O/P EST LOW 20 MIN: CPT | Mod: S$PBB,,, | Performed by: ORTHOPAEDIC SURGERY

## 2019-01-01 PROCEDURE — 99999 PR PBB SHADOW E&M-EST. PATIENT-LVL III: CPT | Mod: PBBFAC,,, | Performed by: ORTHOPAEDIC SURGERY

## 2019-01-01 PROCEDURE — 99213 OFFICE O/P EST LOW 20 MIN: CPT | Mod: S$PBB,,, | Performed by: PHYSICIAN ASSISTANT

## 2019-01-01 PROCEDURE — 99999 PR PBB SHADOW E&M-EST. PATIENT-LVL III: ICD-10-PCS | Mod: PBBFAC,,, | Performed by: ORTHOPAEDIC SURGERY

## 2019-01-01 RX ORDER — OXYCODONE AND ACETAMINOPHEN 10; 325 MG/1; MG/1
1 TABLET ORAL 3 TIMES DAILY PRN
Qty: 90 TABLET | Refills: 0 | Status: ON HOLD | OUTPATIENT
Start: 2019-01-01 | End: 2019-01-01 | Stop reason: HOSPADM

## 2019-01-01 RX ORDER — OXYCODONE AND ACETAMINOPHEN 10; 325 MG/1; MG/1
1 TABLET ORAL 3 TIMES DAILY PRN
Qty: 90 TABLET | Refills: 0 | Status: ON HOLD | OUTPATIENT
Start: 2019-03-05 | End: 2019-01-01 | Stop reason: HOSPADM

## 2019-01-02 NOTE — TELEPHONE ENCOUNTER
----- Message from Adrianna Cates sent at 1/2/2019 11:28 AM CST -----  Contact: [phone]  Patient is requesting a call back from the nurse stated he suppose to have an appt setup, no appt for pain management shown in epic.  When is patient due to be seen?    Please call the patient upon request at phone number [phone].
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
## 2019-01-09 NOTE — PROGRESS NOTES
September 28, 2018  Past Medical History:   Diagnosis Date    Anticoagulant long-term use     ASA 81mg, plavix    Anxiety     Arthritis     Cancer     throat    CHF (congestive heart failure)     Coronary artery disease CABG    6 grafts, stent after surgery    Emphysema of lung     Encounter for blood transfusion     GERD (gastroesophageal reflux disease)     History of thoracentesis 07/2018    Hyperlipidemia     Hypertension     Myocardial infarction     Pacemaker     Thyroid disease     Wears dentures     uppers; bridges on bottom       Past Surgical History:   Procedure Laterality Date    CARDIAC CATHETERIZATION      stent    CARDIAC PACEMAKER PLACEMENT      CARDIAC SURGERY      6 vessel bypass    CATARACT EXTRACTION, BILATERAL  12/2012    COLONOSCOPY      CORONARY ARTERY BYPASS GRAFT  2007?    x6 vessels    JAMARI-TRANSFORAMINAL L4 Bilateral 12/12/2014    [facility] at [facility] OR    EYE SURGERY Bilateral     cataract    INJECTION, FACET JOINT Bilateral 9/4/2012    [facility] at [facility] OR    INJECTION, STEROID, SPINE, LUMBAR, EPIDURAL N/A 2/4/2013    Per[facility] at [facility] OR    INJECTION-STEROID-EPIDURAL-CERVICAL Right 2/8/2018    Per[facility] at [facility] OR    Insertion,catheter,tunneled pleurx cath N/A 12/11/2018    [facility] CATH    PINNING, HIP Right 9/28/2018    [facility] OR    PROSTATE SURGERY      remove toe nail      SALIVARY GLAND SURGERY      TRIAL-STIMULATOR-DORSAL COLUMN N/A 10/18/2016    Performed by [facility] Steve [facility] at [facility] OR       Current Outpatient Medications   Medication Sig    amLODIPine (NORVASC) 2.5 MG tablet TAKE 1 TABLET BY MOUTH ONCE DAILY FOR HIGH BLOOD PRESSURE    aspirin 81 mg Tab Take 1 tablet by mouth Every morning. Every morning    benazepril (LOTENSIN) 5 MG tablet TAKE 1 TABLET BY MOUTH DAILY  FOR HIGH BLOOD PRESSURE    carisoprodol (SOMA) 350 MG tablet TK 1 T PO Q 6 H PRN    carvedilol (COREG) 3.125 MG tablet TAKE 1 TABLET BY MOUTH 2 TIMES DAILY BEFORE MEALS - FOR HEART OR HIGH BLOOD PRESSURE    clopidogrel (PLAVIX) 75 mg tablet TAKE 1 TABLET BY MOUTH DAILY (BLOOD THINNER)    fluticasone-vilanterol (BREO ELLIPTA) 100-25 mcg/dose diskus inhaler Inhale 1 puff into the lungs once daily. Controller    furosemide (LASIX) 20 MG tablet Take 1 tablet (20 mg total) by mouth once daily.    levothyroxine (SYNTHROID) 100 MCG tablet Take 1 tablet by mouth Every morning. Every morning    meclizine (ANTIVERT) 32 MG tablet Take 12.5 mg by mouth 3 (three) times daily as needed.    nitroGLYCERIN (NITROSTAT) 0.4 MG SL tablet Place 1 tablet (0.4 mg total) under the tongue every 5 (five) minutes as needed for Chest pain. As directed PRN    oxyCODONE-acetaminophen (PERCOCET)  mg per tablet Take 1 tablet by mouth 3 (three) times daily as needed for Pain.    [START ON 2/3/2019] oxyCODONE-acetaminophen (PERCOCET)  mg per tablet Take 1 tablet by mouth 3 (three) times daily as needed for Pain.    [START ON 3/5/2019] oxyCODONE-acetaminophen (PERCOCET)  mg per tablet Take 1 tablet by mouth 3 (three) times daily as needed for Pain.    RANEXA 500 mg Tb12 TAKE 1 TABLET BY MOUTH TWICE DAILY FOR CHEST PAIN PREVENTION    simvastatin (ZOCOR) 40 MG tablet TAKE 1 TABLET BY MOUTH EACH EVENING FOR CHOLESTEROL    temazepam (RESTORIL) 30 mg capsule TAKE 1 CAPSULE BY MOUTH EACH NIGHT AT BEDTIME BEFORE SLEEP    albuterol (PROVENTIL) 2.5 mg /3 mL (0.083 %) nebulizer solution Take 3 mLs (2.5 mg total) by nebulization every 6 (six) hours as needed for Wheezing. Rescue    LORazepam (ATIVAN) 1 MG tablet Take 1 tablet (1 mg total) by mouth every evening.     No current facility-administered medications for this visit.        Review of patient's allergies indicates:   Allergen Reactions    No known drug allergies         Family History   Problem Relation Age of Onset    Cancer Sister         pancreatic    Heart disease Sister     Alcohol abuse Brother     No Known Problems Daughter     No Known Problems Sister     No Known Problems Sister     No Known Problems Sister     No Known Problems Sister     Heart disease Brother     No Known Problems Son     No Known Problems Son     No Known Problems Son     No Known Problems Son     No Known Problems Son        Social History     Socioeconomic History    Marital status:      Spouse name: Not on file    Number of children: Not on file    Years of education: Not on file    Highest education level: Not on file   Social Needs    Financial resource strain: Not on file    Food insecurity - worry: Not on file    Food insecurity - inability: Not on file    Transportation needs - medical: Not on file    Transportation needs - non-medical: Not on file   Occupational History    Not on file   Tobacco Use    Smoking status: Current Every Day Smoker     Packs/day: 0.25     Years: 50.00     Pack years: 12.50     Types: Cigarettes     Start date: 12/3/1960    Smokeless tobacco: Never Used    Tobacco comment: 2-3 cigarettes daily   Substance and Sexual Activity    Alcohol use: No    Drug use: Yes     Types: Marijuana     Comment: not prescribed    Sexual activity: Not on file   Other Topics Concern    Not on file   Social History Narrative    Not on file       Chief Complaint:   Chief Complaint   Patient presents with    Post-op Evaluation     s/p right hip pinning 9/28/18        Date of surgery:  September 28, 2018    History of present illness:  78-year-old male who underwent percutaneous pinning of her right valgus impacted femoral neck fracture. Patient rates his pain as a 7/10.  He has been doing a lot a walking however.  No wound issues.  Walking a little more.      Review of Systems:    Musculoskeletal:  See HPI        Physical Examination:    Vital  Signs:    Vitals:    01/09/19 1244   BP: 117/68   Pulse: (!) 58       Body mass index is 20.05 kg/m².    This a well-developed, well nourished patient in no acute distress.  They are alert and oriented and cooperative to examination.  Comes in a wheelchair.    Examination of the right hip shows well-healed surgical incision.  There is some   Small hematoma underneath wound.    X-rays:  X-rays of the right hip are ordered and reviewed which show hip screws in good position. No loss of reduction.     Assessment::  Status post percutaneous screws of right femoral neck fracture    Plan:  Reviewed the findings with him today.  Okay for weight-bearing as tolerated.  Recommended some PT to do some treatment for the hematoma.  Follow up in 3 months.  No new x-ray needed.      This note was created using M [employer] voice recognition software that occasionally misinterpreted phrases or words.

## 2019-02-04 PROBLEM — J81.0 ACUTE PULMONARY EDEMA: Status: ACTIVE | Noted: 2019-01-01

## 2019-02-04 PROBLEM — J90 PLEURAL EFFUSION: Status: RESOLVED | Noted: 2018-01-01 | Resolved: 2019-01-01

## 2019-12-03 NOTE — TELEPHONE ENCOUNTER
[facility] Rylan Sylvester (Self)  Phone 1: [phone] (Please contact pt about devise check appointment. Appointment with Dr. Christina has been reschedule.

## 2023-03-13 NOTE — TELEPHONE ENCOUNTER
----- Message from Sherrie Avalos sent at 11/1/2017  1:58 PM CDT -----  Contact: self  Patient is requesting a call back from nurse   Please call back [phone] (home)     
Called number provided and it was busy. Left voicemail on other number  
(1) More than 48 hours/None

## (undated) DEVICE — ELECTRODE REM PLYHSV RETURN 9

## (undated) DEVICE — GLOVE SURG ULTRA TOUCH 8

## (undated) DEVICE — DRESSING TRANS 4X4 TEGADERM

## (undated) DEVICE — APPLICATOR CHLORAPREP ORN 26ML

## (undated) DEVICE — GAUZE SPONGE BULKEE 6X6.75IN

## (undated) DEVICE — SUT MONOCRYL 3-0 PS-1

## (undated) DEVICE — SEE MEDLINE ITEM 146292

## (undated) DEVICE — BIT DRILL TWST 4.9 FOR 6.5 SCR

## (undated) DEVICE — TRAY NERVE BLOCK

## (undated) DEVICE — IMPLANTABLE DEVICE
Type: IMPLANTABLE DEVICE | Site: HIP | Status: NON-FUNCTIONAL
Removed: 2018-09-28

## (undated) DEVICE — MARKER SKIN STND TIP BLUE BARR

## (undated) DEVICE — SEE MEDLINE ITEM 157131

## (undated) DEVICE — SUT 2-0 VICRYL / CT-1

## (undated) DEVICE — NDL TUOHY EPIDURAL 20G X 3.5

## (undated) DEVICE — PAD CAST SPECIALIST STRL 6

## (undated) DEVICE — GUIDEWIRE CALIBRATED 3.2X300MM
Type: IMPLANTABLE DEVICE | Site: HIP | Status: NON-FUNCTIONAL
Removed: 2018-09-28

## (undated) DEVICE — SOL 9P NACL IRR PIC IL

## (undated) DEVICE — STAPLER SKIN ROTATING HEAD

## (undated) DEVICE — DRESSING TEGADERM 4.4X5IN

## (undated) DEVICE — SLEEVE SCD EXPRESS CALF MEDIUM

## (undated) DEVICE — SYR GLASS 5CC LUER LOK

## (undated) DEVICE — LINER SUCTION 3000CC

## (undated) DEVICE — GOWN X-LG STERILE BACK

## (undated) DEVICE — APPLICATOR CHLORAPREP CLR 10.5

## (undated) DEVICE — GLOVE SURG ULTRA TOUCH 7.5

## (undated) DEVICE — SUT 0 VICRYL / CT-1

## (undated) DEVICE — DRESSING XEROFORM GAUZE 5X9

## (undated) DEVICE — SEE MEDLINE ITEM 152622

## (undated) DEVICE — PACK CUSTOM UNIV BASIN SLI

## (undated) DEVICE — SEE MEDLINE ITEM 152530

## (undated) DEVICE — DRAPE C ARM 42 X 120 10/BX

## (undated) DEVICE — UNDERGLOVES BIOGEL PI SIZE 7.5

## (undated) DEVICE — GLOVE SURGICAL LATEX SZ 7

## (undated) DEVICE — DRAPE IOBAN 2 STERI

## (undated) DEVICE — GOWN B1 X-LG X-LONG